# Patient Record
Sex: MALE | Race: WHITE | NOT HISPANIC OR LATINO | Employment: FULL TIME | URBAN - METROPOLITAN AREA
[De-identification: names, ages, dates, MRNs, and addresses within clinical notes are randomized per-mention and may not be internally consistent; named-entity substitution may affect disease eponyms.]

---

## 2017-07-10 ENCOUNTER — OFFICE VISIT (OUTPATIENT)
Dept: FAMILY MEDICINE | Facility: CLINIC | Age: 62
End: 2017-07-10
Payer: COMMERCIAL

## 2017-07-10 VITALS
HEIGHT: 74 IN | HEART RATE: 68 BPM | SYSTOLIC BLOOD PRESSURE: 102 MMHG | TEMPERATURE: 96.1 F | DIASTOLIC BLOOD PRESSURE: 66 MMHG | BODY MASS INDEX: 26.69 KG/M2 | OXYGEN SATURATION: 97 % | WEIGHT: 208 LBS

## 2017-07-10 DIAGNOSIS — Z00.00 ROUTINE GENERAL MEDICAL EXAMINATION AT A HEALTH CARE FACILITY: ICD-10-CM

## 2017-07-10 DIAGNOSIS — Z12.11 SCREEN FOR COLON CANCER: ICD-10-CM

## 2017-07-10 DIAGNOSIS — Z11.59 NEED FOR HEPATITIS C SCREENING TEST: ICD-10-CM

## 2017-07-10 DIAGNOSIS — I48.20 CHRONIC ATRIAL FIBRILLATION (H): ICD-10-CM

## 2017-07-10 DIAGNOSIS — Z12.5 SCREENING PSA (PROSTATE SPECIFIC ANTIGEN): ICD-10-CM

## 2017-07-10 DIAGNOSIS — Z23 NEED FOR VACCINATION: Primary | ICD-10-CM

## 2017-07-10 DIAGNOSIS — Z23 NEED FOR PROPHYLACTIC VACCINATION WITH TETANUS-DIPHTHERIA (TD): ICD-10-CM

## 2017-07-10 PROBLEM — D86.9 SARCOIDOSIS: Status: ACTIVE | Noted: 2017-07-10

## 2017-07-10 LAB
ERYTHROCYTE [DISTWIDTH] IN BLOOD BY AUTOMATED COUNT: 12.9 % (ref 10–15)
HCT VFR BLD AUTO: 39.8 % (ref 40–53)
HCV AB SERPL QL IA: NORMAL
HGB BLD-MCNC: 13.2 G/DL (ref 13.3–17.7)
MCH RBC QN AUTO: 29.7 PG (ref 26.5–33)
MCHC RBC AUTO-ENTMCNC: 33.2 G/DL (ref 31.5–36.5)
MCV RBC AUTO: 89 FL (ref 78–100)
PLATELET # BLD AUTO: 151 10E9/L (ref 150–450)
RBC # BLD AUTO: 4.45 10E12/L (ref 4.4–5.9)
WBC # BLD AUTO: 5.2 10E9/L (ref 4–11)

## 2017-07-10 PROCEDURE — 85027 COMPLETE CBC AUTOMATED: CPT | Performed by: INTERNAL MEDICINE

## 2017-07-10 PROCEDURE — 80061 LIPID PANEL: CPT | Performed by: INTERNAL MEDICINE

## 2017-07-10 PROCEDURE — 90471 IMMUNIZATION ADMIN: CPT | Performed by: INTERNAL MEDICINE

## 2017-07-10 PROCEDURE — 90715 TDAP VACCINE 7 YRS/> IM: CPT | Performed by: INTERNAL MEDICINE

## 2017-07-10 PROCEDURE — 86803 HEPATITIS C AB TEST: CPT | Performed by: INTERNAL MEDICINE

## 2017-07-10 PROCEDURE — 36415 COLL VENOUS BLD VENIPUNCTURE: CPT | Performed by: INTERNAL MEDICINE

## 2017-07-10 PROCEDURE — 99386 PREV VISIT NEW AGE 40-64: CPT | Mod: 25 | Performed by: INTERNAL MEDICINE

## 2017-07-10 PROCEDURE — G0103 PSA SCREENING: HCPCS | Performed by: INTERNAL MEDICINE

## 2017-07-10 PROCEDURE — 80053 COMPREHEN METABOLIC PANEL: CPT | Performed by: INTERNAL MEDICINE

## 2017-07-10 PROCEDURE — 93000 ELECTROCARDIOGRAM COMPLETE: CPT | Performed by: INTERNAL MEDICINE

## 2017-07-10 RX ORDER — SODIUM PHOSPHATE,MONO-DIBASIC 19G-7G/118
1 ENEMA (ML) RECTAL DAILY
COMMUNITY
End: 2019-09-19

## 2017-07-10 NOTE — PROGRESS NOTES
SUBJECTIVE:   CC: Hayden Chowdary is an 61 year old male who presents for preventative health visit.     HPI    Has atrial fibrillation, but had ablation and has had no symptoms since the procedure  He is on a baby aspirin daily  He was noted to have lung nodules during his evaluation for atrial fibrillation and tells me that he has seen Dr. Ernst who followed the nodules and thinks he may have sarcoidosis, but he has not had a biopsy of any of the nodules   He denies chest pains, dyspnea, palpitations, hemoptysis or cough     Today's PHQ-2 Score:   PHQ-2 ( 1999 Pfizer) 7/10/2017   Q1: Little interest or pleasure in doing things 0   Q2: Feeling down, depressed or hopeless 0   PHQ-2 Score 0       Abuse: Current or Past(Physical, Sexual or Emotional)- No  Do you feel safe in your environment - Yes    Social History   Substance Use Topics     Smoking status: Current Some Day Smoker     Types: Cigars     Smokeless tobacco: Not on file      Comment: in past has smoked cigarettes perhaps a pack a month     Alcohol use No      Comment: not for 20 years     The patient does not drink >3 drinks per day nor >7 drinks per week.    Last PSA: No results found for: PSA    Reviewed orders with patient. Reviewed health maintenance and updated orders accordingly - Yes  Patient Active Problem List   Diagnosis     Chronic atrial fibrillation (H)     Sarcoidosis (H)     Primary osteoarthritis of both knees     Past Surgical History:   Procedure Laterality Date     BACK SURGERY  2008    fusion C5-6-7     C NONSPECIFIC PROCEDURE      pyloric stenosis repair     C NONSPECIFIC PROCEDURE      right inguinal hernia     C NONSPECIFIC PROCEDURE      bladder surgery     C NONSPECIFIC PROCEDURE      emergency appendectomy       Social History   Substance Use Topics     Smoking status: Current Some Day Smoker     Types: Cigars     Smokeless tobacco: Not on file      Comment: in past has smoked cigarettes perhaps a pack a month     Alcohol  "use No      Comment: not for 20 years     Family History   Problem Relation Age of Onset     CANCER Mother      colon cancer 73     Neurologic Disorder Father      dad had ALS,  at 60     Cardiovascular Maternal Grandfather      Arthritis Paternal Grandmother      CANCER Paternal Grandfather      lung cancer     GASTROINTESTINAL DISEASE Sister      Cardiovascular Sister      whole in heart         Current Outpatient Prescriptions   Medication Sig Dispense Refill     Naproxen Sodium (ALEVE PO)        glucosamine-chondroitin 500-400 MG CAPS per capsule Take 1 capsule by mouth daily       ASPIRIN 81 MG OR CPEP 1 CAPSULE DAILY       Allergies   Allergen Reactions     Bee Venom Swelling     Contrast Dye Rash       Reviewed and updated as needed this visit by clinical staff  Tobacco  Allergies  Meds  Med Hx  Surg Hx  Fam Hx  Soc Hx        Reviewed and updated as needed this visit by Provider  Tobacco  Med Hx  Surg Hx  Fam Hx  Soc Hx       Past Medical History:   Diagnosis Date     Atrial fibrillation (H)     s/p catheter ablation 2014     Cancer (H)     basal cell  removed from back x 2     Primary osteoarthritis of both knees 7/10/2017     Sarcoidosis (H) 7/10/2017      Past Surgical History:   Procedure Laterality Date     BACK SURGERY      fusion C5-6-7     C NONSPECIFIC PROCEDURE      pyloric stenosis repair     C NONSPECIFIC PROCEDURE      right inguinal hernia     C NONSPECIFIC PROCEDURE      bladder surgery     C NONSPECIFIC PROCEDURE      emergency appendectomy       ROS:  12 organ system review of systems is negative     OBJECTIVE:   /66 (BP Location: Right arm, Cuff Size: Adult Large)  Pulse 68  Temp 96.1  F (35.6  C) (Tympanic)  Ht 6' 2.25\" (1.886 m)  Wt 208 lb (94.3 kg)  SpO2 97%  BMI 26.53 kg/m2    EXAM:  GENERAL: healthy, alert and no distress  EYES: Eyes grossly normal to inspection, PERRL and conjunctivae and sclerae normal  HENT: ear canals and TM's normal, nose and mouth " without ulcers or lesions  NECK: no adenopathy, no asymmetry, masses, or scars and thyroid normal to palpation  RESP: lungs clear to auscultation - no rales, rhonchi or wheezes  CV: regular rate and rhythm, normal S1 S2, no S3 or S4, no murmur, click or rub, no peripheral edema and peripheral pulses strong  ABDOMEN: soft, nontender, no hepatosplenomegaly, no masses and bowel sounds normal  RECTAL: normal sphincter tone, no rectal masses, prostate normal size, smooth, nontender without nodules or masses  MS: no gross musculoskeletal defects noted, no edema  SKIN: no suspicious lesions or rashes  NEURO: Normal strength and tone, mentation intact and speech normal  PSYCH: mentation appears normal, affect normal/bright    ASSESSMENT/PLAN:   1. Routine general medical examination at a health care facility    - Lipid panel reflex to direct LDL  - Comprehensive metabolic panel  - CBC with platelets    2. Chronic atrial fibrillation (H)  Check EKG today  Monitor for reoccurrence of symptoms     3. Screen for colon cancer  Given family history of q 5 year screening interval   - GASTROENTEROLOGY ADULT REF PROCEDURE ONLY    4. Need for hepatitis C screening test  =  - Hepatitis C Screen Reflex to HCV RNA Quant and Genotype    5. Need for prophylactic vaccination with tetanus-diphtheria (TD)    - TDAP VACCINE (ADACEL) [91755.002]  - 1st  Administration  [58888]    6. Need for vaccination      7. Screening PSA (prostate specific antigen)    - PSA, screen    COUNSELING:   Reviewed preventive health counseling, as reflected in patient instructions  Special attention given to:        Regular exercise       Healthy diet/nutrition       Immunizations    Vaccinated for: TDAP           Consider Hep C screening for patients born between 1945 and 1965       Colon cancer screening       Prostate cancer screening         reports that he has been smoking Cigars.  He does not have any smokeless tobacco history on file.  Counseled on risk of  "cigar smoking.  There is no safe amount of tobacco!    Estimated body mass index is 26.53 kg/(m^2) as calculated from the following:    Height as of this encounter: 6' 2.25\" (1.886 m).    Weight as of this encounter: 208 lb (94.3 kg).   Weight management plan: Discussed healthy diet and exercise guidelines and patient will follow up in 12 months in clinic to re-evaluate.    Counseling Resources:  ATP IV Guidelines  Pooled Cohorts Equation Calculator  FRAX Risk Assessment  ICSI Preventive Guidelines  Dietary Guidelines for Americans, 2010  USDA's MyPlate  ASA Prophylaxis  Lung CA Screening    Grayson Mae MD  [facility]  "

## 2017-07-10 NOTE — LETTER
"[facility]  [address] [facility]  [address]  [place]  [address]  Tel: [phone]    July 12, 2017    Hayden OLVERA Ricarda  [address] [facility] [address]        Dear Mr. Chowdary,    -Your total cholesterol is 207 which is above your goal of total cholesterol less than 200.     -Your triglycerides are 149 which are at your goal of triglycerides less than 150.     -Your HDL or \"good cholesterol\" is 47 which is at your goal of HDL cholesterol greater than 40.     -Your LDL cholesterol or \"bad cholesterol\" is 130 which is at your goal of LDL cholesterol less than <160.  Your LDL goal is based on your risk factors for artery disease.     -Liver and gallbladder tests are normal for you. (ALT,AST, Alk phos, bilirubin), kidney function is normal for you (Creatinine, GFR), Sodium is normal, Potassium is normal for you, Calcium is normal for you, Glucose (blood sugar) is normal for you.       -Your prostate specific antigen (PSA) test result returned normal.     -Your hepatitis C screening test is negative.  You do not have hepatitis C.     -Your complete blood counts including your hemoglobin returned normal for you.         Bottom line:  Your cholesterol is a little high, but you do not need a medication for this.  You can reduce your total and LDL cholesterol levels by eating less saturated fats.  This means you should eat less fried foods and meat.  If you eat meat, you should try to eat more chicken and fish and less beef or pork.  Also, you should increase dietary fiber intake by eating more fruits, vegetables and whole grains.  A diet high in fiber reduces total and LDL cholesterol levels. We can recheck your cholesterol in one year.         Follow up:  Schedule an appointment for a physical examination with fasting blood tests in one year's time, or return sooner if new questions, symptoms or problems arise.       Sincerely,   Dr. Mae/NB    Results for orders placed or performed in " visit on 07/10/17   Hepatitis C Screen Reflex to HCV RNA Quant and Genotype   Result Value Ref Range    Hepatitis C Antibody  NR     Nonreactive   Assay performance characteristics have not been established for newborns,   infants, and children     Lipid panel reflex to direct LDL   Result Value Ref Range    Cholesterol 207 (H) <200 mg/dL    Triglycerides 149 <150 mg/dL    HDL Cholesterol 47 >39 mg/dL    LDL Cholesterol Calculated 130 (H) <100 mg/dL    Non HDL Cholesterol 160 (H) <130 mg/dL   Comprehensive metabolic panel   Result Value Ref Range    Sodium 142 133 - 144 mmol/L    Potassium 4.4 3.4 - 5.3 mmol/L    Chloride 108 94 - 109 mmol/L    Carbon Dioxide 27 20 - 32 mmol/L    Anion Gap 7 3 - 14 mmol/L    Glucose 89 70 - 99 mg/dL    Urea Nitrogen 18 7 - 30 mg/dL    Creatinine 1.00 0.66 - 1.25 mg/dL    GFR Estimate 76 >60 mL/min/1.7m2    GFR Estimate If Black >90   GFR Calc   >60 mL/min/1.7m2    Calcium 8.6 8.5 - 10.1 mg/dL    Bilirubin Total 0.5 0.2 - 1.3 mg/dL    Albumin 4.0 3.4 - 5.0 g/dL    Protein Total 7.2 6.8 - 8.8 g/dL    Alkaline Phosphatase 67 40 - 150 U/L    ALT 23 0 - 70 U/L    AST 14 0 - 45 U/L   CBC with platelets   Result Value Ref Range    WBC 5.2 4.0 - 11.0 10e9/L    RBC Count 4.45 4.4 - 5.9 10e12/L    Hemoglobin 13.2 (L) 13.3 - 17.7 g/dL    Hematocrit 39.8 (L) 40.0 - 53.0 %    MCV 89 78 - 100 fl    MCH 29.7 26.5 - 33.0 pg    MCHC 33.2 31.5 - 36.5 g/dL    RDW 12.9 10.0 - 15.0 %    Platelet Count 151 150 - 450 10e9/L   PSA, screen   Result Value Ref Range    PSA 2.74 0 - 4 ug/L

## 2017-07-10 NOTE — NURSING NOTE
"Chief Complaint   Patient presents with     Physical     Fasting       Initial /66 (BP Location: Right arm, Cuff Size: Adult Large)  Pulse 68  Temp 96.1  F (35.6  C) (Tympanic)  Ht 6' 2.25\" (1.886 m)  Wt 208 lb (94.3 kg)  SpO2 97%  BMI 26.53 kg/m2 Estimated body mass index is 26.53 kg/(m^2) as calculated from the following:    Height as of this encounter: 6' 2.25\" (1.886 m).    Weight as of this encounter: 208 lb (94.3 kg).  Medication Reconciliation: complete   Laurence Guerrero MA      "

## 2017-07-10 NOTE — MR AVS SNAPSHOT
After Visit Summary   7/10/2017    Hayden Chowdary    MRN: [id number]           Patient Information     Date Of Birth          1955        Visit Information        Provider Department      7/10/2017 9:30 AM Grayson Mae MD [facility]        Today's Diagnoses     Need for vaccination    -  1    Routine general medical examination at a health care facility        Chronic atrial fibrillation (H)        Screen for colon cancer        Need for hepatitis C screening test        Need for prophylactic vaccination with tetanus-diphtheria (TD)        Screening PSA (prostate specific antigen)           Follow-ups after your visit        Additional Services     GASTROENTEROLOGY ADULT REF PROCEDURE ONLY       Last Lab Result: Creatinine (mg/dL)       Date                     Value                 01/31/2014               1.28 (H)         ----------  Body mass index is 26.53 kg/(m^2).     Needed:  No  Language:  English    Patient will be contacted to schedule procedure.     Please be aware that coverage of these services is subject to the terms and limitations of your health insurance plan.  Call member services at your health plan with any benefit or coverage questions.  Any procedures must be performed at a [facility] facility OR coordinated by your clinic's referral office.    Please bring the following with you to your appointment:    (1) Any X-Rays, CTs or MRIs which have been performed.  Contact the facility where they were done to arrange for  prior to your scheduled appointment.    (2) List of current medications   (3) This referral request   (4) Any documents/labs given to you for this referral                  Who to contact     If you have questions or need follow up information about today's clinic visit or your schedule please contact [facility] directly at [phone].  Normal or non-critical lab and imaging results will be communicated to you by  "MyChart, letter or phone within 4 business days after the clinic has received the results. If you do not hear from us within 7 days, please contact the clinic through [employer] or phone. If you have a critical or abnormal lab result, we will notify you by phone as soon as possible.  Submit refill requests through [employer] or call your pharmacy and they will forward the refill request to us. Please allow 3 business days for your refill to be completed.          Additional Information About Your Visit        [employer] Information     [employer] lets you send messages to your doctor, view your test results, renew your prescriptions, schedule appointments and more. To sign up, go to [email or web address] . Click on \"Log in\" on the left side of the screen, which will take you to the Welcome page. Then click on \"Sign up Now\" on the right side of the page.     You will be asked to enter the access code listed below, as well as some personal information. Please follow the directions to create your username and password.     Your access code is: [email or web address]  Expires: 10/8/2017  9:42 AM     Your access code will  in 90 days. If you need help or a new code, please call your [place] clinic or [phone].        Care EveryWhere ID     This is your Care EveryWhere ID. This could be used by other organizations to access your [place] medical records  [id number]        Your Vitals Were     Pulse Temperature Height Pulse Oximetry BMI (Body Mass Index)       68 96.1  F (35.6  C) (Tympanic) 6' 2.25\" (1.886 m) 97% 26.53 kg/m2        Blood Pressure from Last 3 Encounters:   07/10/17 102/66   06/18/15 100/64   14 104/66    Weight from Last 3 Encounters:   07/10/17 208 lb (94.3 kg)   06/18/15 218 lb (98.9 kg)   14 197 lb (89.4 kg)              We Performed the Following     1st  Administration  [[id number]]     CBC with platelets     Comprehensive metabolic panel     EKG 12-lead complete w/read - Clinics     GASTROENTEROLOGY " ADULT REF PROCEDURE ONLY     Hepatitis C Screen Reflex to HCV RNA Quant and Genotype     Lipid panel reflex to direct LDL     PSA, screen     TDAP VACCINE (ADACEL) [77464.002]        Primary Care Provider Office Phone # Fax #    Grayson Mae [phone]       [facility] [address] [facility] [facility] [address]        Equal Access to Services     [facility] HospitalSUMAYA : Hadii aad ku hadasho Soomaali, waaxda luqadaha, qaybta kaalmada adeegyada, waxay idiin hayaan adeeg kharash la'aan ah. So [facility] [phone].    ATENCIÓN: Si habla español, tiene a robertson disposición servicios gratuitos de asistencia lingüística. Ll[facility] North [facility] [phone].    We comply with applicable federal civil rights laws and [place] laws. We do not discriminate on the basis of race, color, national origin, age, disability sex, sexual orientation or gender identity.            Thank you!     Thank you for choosing [facility]  for your care. Our goal is always to provide you with excellent care. Hearing back from our patients is one way we can continue to improve our services. Please take a few minutes to complete the written survey that you may receive in the mail after your visit with us. Thank you!             Your Updated Medication List - Protect others around you: Learn how to safely use, store and throw away your medicines at www.disposemymeds.org.          This list is accurate as of: 7/10/17 10:41 AM.  Always use your most recent med list.                   Brand Name Dispense Instructions for use Diagnosis    ALEVE PO           ASPIRIN 81 MG Cpep      1 CAPSULE DAILY        glucosamine-chondroitin 500-400 MG Caps per capsule      Take 1 capsule by mouth daily

## 2017-07-10 NOTE — NURSING NOTE
Screening Questionnaire for Adult Immunization    Are you sick today?   No   Do you have allergies to medications, food, a vaccine component or latex?   No   Have you ever had a serious reaction after receiving a vaccination?   No   Do you have a long-term health problem with heart disease, lung disease, asthma, kidney disease, metabolic disease (e.g. diabetes), anemia, or other blood disorder?   No   Do you have cancer, leukemia, HIV/AIDS, or any other immune system problem?   No   In the past 3 months, have you taken medications that affect  your immune system, such as prednisone, other steroids, or anticancer drugs; drugs for the treatment of rheumatoid arthritis, Crohn s disease, or psoriasis; or have you had radiation treatments?   No   Have you had a seizure, or a brain or other nervous system problem?   No   During the past year, have you received a transfusion of blood or blood     products, or been given immune (gamma) globulin or antiviral drug?   No   For women: Are you pregnant or is there a chance you could become        pregnant during the next month?   No   Have you received any vaccinations in the past 4 weeks?   No     Immunization questionnaire answers were all negative.      MNVFC doesn't apply on this patient    Per orders of Dr. Mae, injection of Tdap given by Laurence Guerrero. Patient instructed to remain in clinic for 15 minutes afterwards, and to report any adverse reaction to me immediately.       Screening performed by Laurence Guerrero on 7/10/2017 at 9:10 AM.

## 2017-07-11 LAB
ALBUMIN SERPL-MCNC: 4 G/DL (ref 3.4–5)
ALP SERPL-CCNC: 67 U/L (ref 40–150)
ALT SERPL W P-5'-P-CCNC: 23 U/L (ref 0–70)
ANION GAP SERPL CALCULATED.3IONS-SCNC: 7 MMOL/L (ref 3–14)
AST SERPL W P-5'-P-CCNC: 14 U/L (ref 0–45)
BILIRUB SERPL-MCNC: 0.5 MG/DL (ref 0.2–1.3)
BUN SERPL-MCNC: 18 MG/DL (ref 7–30)
CALCIUM SERPL-MCNC: 8.6 MG/DL (ref 8.5–10.1)
CHLORIDE SERPL-SCNC: 108 MMOL/L (ref 94–109)
CHOLEST SERPL-MCNC: 207 MG/DL
CO2 SERPL-SCNC: 27 MMOL/L (ref 20–32)
CREAT SERPL-MCNC: 1 MG/DL (ref 0.66–1.25)
GFR SERPL CREATININE-BSD FRML MDRD: 76 ML/MIN/1.7M2
GLUCOSE SERPL-MCNC: 89 MG/DL (ref 70–99)
HDLC SERPL-MCNC: 47 MG/DL
LDLC SERPL CALC-MCNC: 130 MG/DL
NONHDLC SERPL-MCNC: 160 MG/DL
POTASSIUM SERPL-SCNC: 4.4 MMOL/L (ref 3.4–5.3)
PROT SERPL-MCNC: 7.2 G/DL (ref 6.8–8.8)
PSA SERPL-ACNC: 2.74 UG/L (ref 0–4)
SODIUM SERPL-SCNC: 142 MMOL/L (ref 133–144)
TRIGL SERPL-MCNC: 149 MG/DL

## 2017-07-12 NOTE — PROGRESS NOTES
"The following letter pertains to your most recent diagnostic tests:    -Your total cholesterol is 207 which is above your goal of total cholesterol less than 200.    -Your triglycerides are 149 which are at your goal of triglycerides less than 150.    -Your HDL or \"good cholesterol\" is 47 which is at your goal of HDL cholesterol greater than 40.    -Your LDL cholesterol or \"bad cholesterol\" is 130 which is at your goal of LDL cholesterol less than <160.  Your LDL goal is based on your risk factors for artery disease.     -Liver and gallbladder tests are normal for you. (ALT,AST, Alk phos, bilirubin), kidney function is normal for you (Creatinine, GFR), Sodium is normal, Potassium is normal for you, Calcium is normal for you, Glucose (blood sugar) is normal for you.      -Your prostate specific antigen (PSA) test result returned normal.     -Your hepatitis C screening test is negative.  You do not have hepatitis C.     -Your complete blood counts including your hemoglobin returned normal for you.         Bottom line:  Your cholesterol is a little high, but you do not need a medication for this.  You can reduce your total and LDL cholesterol levels by eating less saturated fats.  This means you should eat less fried foods and meat.  If you eat meat, you should try to eat more chicken and fish and less beef or pork.  Also, you should increase dietary fiber intake by eating more fruits, vegetables and whole grains.  A diet high in fiber reduces total and LDL cholesterol levels. We can recheck your cholesterol in one year.        Follow up:  Schedule an appointment for a physical examination with fasting blood tests in one year's time, or return sooner if new questions, symptoms or problems arise.       Sincerely,    Dr. Mae    The 10-year ASCVD risk score (Belia JULIA Jr, et al., 2013) is: 5.8%    Values used to calculate the score:      Age: 61 years      Sex: Male      Is Non- : No      " Diabetic: No      Tobacco smoker: No      Systolic Blood Pressure: 102 mmHg      Is BP treated: No      HDL Cholesterol: 47 mg/dL      Total Cholesterol: 207 mg/dL

## 2017-09-18 ENCOUNTER — TRANSFERRED RECORDS (OUTPATIENT)
Dept: HEALTH INFORMATION MANAGEMENT | Facility: CLINIC | Age: 62
End: 2017-09-18

## 2018-02-05 ENCOUNTER — OFFICE VISIT (OUTPATIENT)
Dept: FAMILY MEDICINE | Facility: CLINIC | Age: 63
End: 2018-02-05
Payer: COMMERCIAL

## 2018-02-05 VITALS
SYSTOLIC BLOOD PRESSURE: 100 MMHG | TEMPERATURE: 97.4 F | WEIGHT: 215 LBS | OXYGEN SATURATION: 97 % | BODY MASS INDEX: 27.59 KG/M2 | DIASTOLIC BLOOD PRESSURE: 70 MMHG | HEIGHT: 74 IN | HEART RATE: 73 BPM

## 2018-02-05 DIAGNOSIS — D86.9 SARCOIDOSIS: ICD-10-CM

## 2018-02-05 DIAGNOSIS — H26.9 CATARACT OF BOTH EYES, UNSPECIFIED CATARACT TYPE: ICD-10-CM

## 2018-02-05 DIAGNOSIS — I48.20 CHRONIC ATRIAL FIBRILLATION (H): ICD-10-CM

## 2018-02-05 DIAGNOSIS — Z01.818 PREOP GENERAL PHYSICAL EXAM: Primary | ICD-10-CM

## 2018-02-05 PROCEDURE — 93000 ELECTROCARDIOGRAM COMPLETE: CPT | Performed by: INTERNAL MEDICINE

## 2018-02-05 PROCEDURE — 99214 OFFICE O/P EST MOD 30 MIN: CPT | Performed by: INTERNAL MEDICINE

## 2018-02-05 RX ORDER — TURMERIC ROOT EXTRACT 500 MG
500 TABLET ORAL DAILY
COMMUNITY
End: 2019-09-19

## 2018-02-05 NOTE — NURSING NOTE
"Chief Complaint   Patient presents with     Pre-Op Exam       Initial /70 (BP Location: Right arm, Cuff Size: Adult Regular)  Pulse 73  Temp 97.4  F (36.3  C) (Oral)  Ht 6' 2.25\" (1.886 m)  Wt 215 lb (97.5 kg)  SpO2 97%  BMI 27.42 kg/m2 Estimated body mass index is 27.42 kg/(m^2) as calculated from the following:    Height as of this encounter: 6' 2.25\" (1.886 m).    Weight as of this encounter: 215 lb (97.5 kg).  Medication Reconciliation: complete    "

## 2018-02-05 NOTE — PROGRESS NOTES
[address] [address]  [phone]  Dept: [phone]    PRE-OP EVALUATION:  Today's date: 2018    Hayden Chowdary (: 1955) presents for pre-operative evaluation assessment as requested by Dr. Brown.  He requires evaluation and anesthesia risk assessment prior to undergoing surgery/procedure for treatment of bilateral cataracts .  Proposed procedure:  2018 Cataract Removal, right and 2018 Cataract Removal, left.    Date of Surgery/ Procedure: 2018 and 2018  Time of Surgery/ Procedure: 7:30 AM  Hospital/Surgical Facility: [facility]  Fax number for surgical facility: [phone]  Primary Physician: Grayson Mae  Type of Anesthesia Anticipated: to be determined    Patient has a Health Care Directive or Living Will:  NO    1. NO - Do you have a history of heart attack, stroke, stent, bypass or surgery on an artery in the head, neck, heart or legs?  2. NO - Do you ever have any pain or discomfort in your chest?  3. NO - Do you have a history of  Heart Failure?  4. NO - Are you troubled by shortness of breath when: walking on the level, up a slight hill or at night?  5. NO - Do you currently have a cold, bronchitis or other respiratory infection?  6. NO - Do you have a cough, shortness of breath or wheezing?  7. NO - Do you sometimes get pains in the calves of your legs when you walk?  8. NO - Do you or anyone in your family have previous history of blood clots?  9. NO - Do you or does anyone in your family have a serious bleeding problem such as prolonged bleeding following surgeries or cuts?  10. NO - Have you ever had problems with anemia or been told to take iron pills?  11. NO - Have you had any abnormal blood loss such as black, tarry or bloody stools, or abnormal vaginal bleeding?  12. NO - Have you ever had a blood transfusion?  13. NO - Have you or any of your relatives ever had problems with  anesthesia?  14. NO - Do you have sleep apnea, excessive snoring or daytime drowsiness?  15. NO - Do you have any prosthetic heart valves?  16. NO - Do you have prosthetic joints?  17. NO - Is there any chance that you may be pregnant?      HPI:                                                      Brief HPI related to upcoming procedure: progressive night vision problems in both eyes worse over years now moderate to severe.  He can easily perform 4 METS of physical activity without chest pain or dyspnea.  He was told he has pulmonary sarcoidosis in the past, but was told he did not need follow up with pulmonary medicine after several visits.  He has no current symptoms of cough, sputum production, fevers, dyspnea.  He had a atrial fibrillation ablation in 1/2014 and has not had recurrent palpitations or chest pains since the procedure.  His EKG in 7/2017 showed sinus rhythm.        MEDICAL HISTORY:                                                      Patient Active Problem List    Diagnosis Date Noted     Sarcoidosis 07/10/2017     Priority: Medium     Primary osteoarthritis of both knees 07/10/2017     Priority: Medium     Chronic atrial fibrillation (H) 06/12/2014     Priority: Medium     Status post atrial fibrillation ablation         Past Medical History:   Diagnosis Date     Atrial fibrillation (H)     s/p catheter ablation 1/2014     Cancer (H)     basal cell  removed from back x 2     Primary osteoarthritis of both knees 7/10/2017     Sarcoidosis 7/10/2017     Past Surgical History:   Procedure Laterality Date     BACK SURGERY  2008    fusion C5-6-7     C NONSPECIFIC PROCEDURE      pyloric stenosis repair     C NONSPECIFIC PROCEDURE      right inguinal hernia     C NONSPECIFIC PROCEDURE      bladder surgery     C NONSPECIFIC PROCEDURE      emergency appendectomy     Current Outpatient Prescriptions   Medication Sig Dispense Refill     Turmeric 500 MG TABS Take 500 mg by mouth daily       Naproxen Sodium  "(ALEVE PO) Take 220 mg by mouth 2 times daily (with meals)        glucosamine-chondroitin 500-400 MG CAPS per capsule Take 1 capsule by mouth daily       OTC products: None, except as noted above    Allergies   Allergen Reactions     Bee Venom Swelling     Contrast Dye Rash      Latex Allergy: NO    Social History   Substance Use Topics     Smoking status: Former Smoker     Types: Cigars     Smokeless tobacco: Never Used      Comment: in past has smoked cigarettes perhaps a pack a month     Alcohol use No      Comment: not for 20 years     History   Drug Use No       REVIEW OF SYSTEMS:                                                    A 12 organ systems ROS is negative.     EXAM:                                                    /70 (BP Location: Right arm, Cuff Size: Adult Regular)  Pulse 73  Temp 97.4  F (36.3  C) (Oral)  Ht 6' 2.25\" (1.886 m)  Wt 215 lb (97.5 kg)  SpO2 97%  BMI 27.42 kg/m2    GENERAL APPEARANCE: healthy, alert and no distress     EYES: EOMI,  PERRL     HENT: ear canals and TM's normal and nose and mouth without ulcers or lesions     NECK: no adenopathy, no asymmetry, masses, or scars and thyroid normal to palpation     RESP: lungs clear to auscultation - no rales, rhonchi or wheezes     CV: regular rates and rhythm, normal S1 S2, no S3 or S4 and no murmur, click or rub     ABDOMEN:  soft, nontender, no HSM or masses and bowel sounds normal     MS: extremities normal- no gross deformities noted, no evidence of inflammation in joints, FROM in all extremities.     SKIN: no suspicious lesions or rashes     NEURO: Normal strength and tone, sensory exam grossly normal, mentation intact and speech normal     PSYCH: mentation appears normal. and affect normal/bright     LYMPHATICS: No cervical, or supraclavicular nodes    DIAGNOSTICS:                                                    EKG: Sinus rhythm, incomplete LBBB, no ST changes     Recent Labs   Lab Test  07/10/17   1044  01/31/14   " 0658   HGB  13.2*  15.8   PLT  151  135*   NA  142  140   POTASSIUM  4.4  4.4   CR  1.00  1.28*        IMPRESSION:                                                    Reason for surgery/procedure: Bilateral cataracts   Diagnosis/reason for consult: Preoperative evaluation     The proposed surgical procedure is considered LOW risk.    REVISED CARDIAC RISK INDEX  The patient has the following serious cardiovascular risks for perioperative complications such as (MI, PE, VFib and 3  AV Block):  No serious cardiac risks  INTERPRETATION: 0 risks: Class I (very low risk - 0.4% complication rate)    The patient has the following additional risks for perioperative complications:  No identified additional risks      ICD-10-CM    1. Preop general physical exam Z01.818 EKG 12-lead complete w/read - Clinics   2. Cataract of both eyes, unspecified cataract type H26.9    3. Chronic atrial fibrillation (H) I48.2    4. Sarcoidosis D86.9        RECOMMENDATIONS:                                                        APPROVAL GIVEN to proceed with proposed procedure, without further diagnostic evaluation       Signed Electronically by: Grayson Mae MD    Copy of this evaluation report is provided to requesting physician.    [facility] Preop Guidelines

## 2018-02-05 NOTE — MR AVS SNAPSHOT
After Visit Summary   2/5/2018    Hayden Chowdary    MRN: [id number]           Patient Information     Date Of Birth          1955        Visit Information        Provider Department      2/5/2018 1:30 PM Grayson Mae MD [facility]        Today's Diagnoses     Preop general physical exam    -  1    Cataract of both eyes, unspecified cataract type        Chronic atrial fibrillation (H)        Sarcoidosis          Care Instructions      Before Your Surgery      Call your surgeon if there is any change in your health. This includes signs of a cold or flu (such as a sore throat, runny nose, cough, rash or fever).    Do not smoke, drink alcohol or take over the counter medicine (unless your surgeon or primary care doctor tells you to) for the 24 hours before and after surgery.    If you take prescribed drugs: Follow your doctor s orders about which medicines to take and which to stop until after surgery.    Eating and drinking prior to surgery: follow the instructions from your surgeon    Take a shower or bath the night before surgery. Use the soap your surgeon gave you to gently clean your skin. If you do not have soap from your surgeon, use your regular soap. Do not shave or scrub the surgery site.  Wear clean pajamas and have clean sheets on your bed.           Follow-ups after your visit        Who to contact     If you have questions or need follow up information about today's clinic visit or your schedule please contact [facility] directly at [phone].  Normal or non-critical lab and imaging results will be communicated to you by MyChart, letter or phone within 4 business days after the clinic has received the results. If you do not hear from us within 7 days, please contact the clinic through MyChart or phone. If you have a critical or abnormal lab result, we will notify you by phone as soon as possible.  Submit refill requests through [employer] or call your  "pharmacy and they will forward the refill request to us. Please allow 3 business days for your refill to be completed.          Additional Information About Your Visit        MyChart Information     [employer] gives you secure access to your electronic health record. If you see a primary care provider, you can also send messages to your care team and make appointments. If you have questions, please call your primary care clinic.  If you do not have a primary care provider, please call [phone] and they will assist you.        Care EveryWhere ID     This is your Care EveryWhere ID. This could be used by other organizations to access your [facility] medical records  [id number]        Your Vitals Were     Pulse Temperature Height Pulse Oximetry BMI (Body Mass Index)       73 97.4  F (36.3  C) (Oral) 6' 2.25\" (1.886 m) 97% 27.42 kg/m2        Blood Pressure from Last 3 Encounters:   02/05/18 100/70   07/10/17 102/66   06/18/15 100/64    Weight from Last 3 Encounters:   02/05/18 215 lb (97.5 kg)   07/10/17 208 lb (94.3 kg)   06/18/15 218 lb (98.9 kg)              We Performed the Following     EKG 12-lead complete w/read - Clinics        Primary Care Provider Office Phone # Fax #    Grayson TRAORE MD [facility] [phone]       [facility] [address]  [place] [address]        Equal Access to Services     [facility]: Hadii aad ku hadasho Soomaali, waaxda luqadaha, qaybta kaalmada adeegyada, willi davenport . So [facility] [phone].    ATENCIÓN: Si habla español, tiene a robertson disposición servicios gratuitos de asistencia lingüística. Llame al [phone].    We comply with applicable federal civil rights laws and [place] laws. We do not discriminate on the basis of race, color, national origin, age, disability, sex, sexual orientation, or gender identity.            Thank you!     Thank you for choosing [facility]  for your care. Our goal is always to " provide you with excellent care. Hearing back from our patients is one way we can continue to improve our services. Please take a few minutes to complete the written survey that you may receive in the mail after your visit with us. Thank you!             Your Updated Medication List - Protect others around you: Learn how to safely use, store and throw away your medicines at www.disposemymeds.org.          This list is accurate as of 2/5/18  5:05 PM.  Always use your most recent med list.                   Brand Name Dispense Instructions for use Diagnosis    ALEVE PO      Take 220 mg by mouth 2 times daily (with meals)        glucosamine-chondroitin 500-400 MG Caps per capsule      Take 1 capsule by mouth daily        Turmeric 500 MG Tabs      Take 500 mg by mouth daily

## 2018-11-15 ENCOUNTER — TELEPHONE (OUTPATIENT)
Dept: OTHER | Facility: CLINIC | Age: 63
End: 2018-11-15

## 2018-11-15 NOTE — TELEPHONE ENCOUNTER
11/15/2018    Call Regarding Onboarding P1 Other    Attempt 1    Message HUNG UP    Comments:       Outreach   CWR

## 2018-12-01 NOTE — TELEPHONE ENCOUNTER
12/1/2018    Call Regarding Onboarding P1 Other    Attempt 2    Message on voicemail     Comments:       Outreach   CWR

## 2019-05-21 ENCOUNTER — DOCUMENTATION ONLY (OUTPATIENT)
Dept: OTHER | Facility: CLINIC | Age: 64
End: 2019-05-21

## 2019-08-20 ENCOUNTER — OFFICE VISIT (OUTPATIENT)
Dept: FAMILY MEDICINE | Facility: CLINIC | Age: 64
End: 2019-08-20
Payer: COMMERCIAL

## 2019-08-20 VITALS
WEIGHT: 211.9 LBS | RESPIRATION RATE: 16 BRPM | OXYGEN SATURATION: 98 % | BODY MASS INDEX: 27.02 KG/M2 | TEMPERATURE: 97.6 F | HEART RATE: 58 BPM | DIASTOLIC BLOOD PRESSURE: 72 MMHG | SYSTOLIC BLOOD PRESSURE: 116 MMHG

## 2019-08-20 DIAGNOSIS — Z71.84 ENCOUNTER FOR COUNSELING FOR TRAVEL: Primary | ICD-10-CM

## 2019-08-20 PROCEDURE — 99401 PREV MED CNSL INDIV APPRX 15: CPT | Performed by: PHYSICIAN ASSISTANT

## 2019-08-20 RX ORDER — AZITHROMYCIN 500 MG/1
500 TABLET, FILM COATED ORAL DAILY
Qty: 3 TABLET | Refills: 0 | Status: SHIPPED | OUTPATIENT
Start: 2019-08-20 | End: 2019-09-19

## 2019-08-20 NOTE — PROGRESS NOTES
SUBJECTIVE: Hayden Chowdary , a 63 year old  male, presents for counseling and information regarding upcoming travel to [place], [facility], and [facility]. Special medical concerns include: none. He anticipates the following unusual exposures: none.    Itinerary:  [facility], [facility]     Departure Date: 9/3/19 Return date: 9/14/19    Reason for travel (i.e. Business, pleasure): pleasure     Visiting an urban or rural area?: both      Accommodations (i.e. hotel, hostel, friends, family, etc): hotel and criuse     Women - First day of your last period:  NA     IMMUNIZATION HISTORY  Have you received any vaccinations in the past 4 weeks?  No  Have you ever fainted from having your blood drawn or from an injection?  No  Have you ever had a fever reaction to vaccination?  No  Have you ever had any bad reaction or side effect from any vaccination?  No  Have you ever had hepatitis A or B vaccine?  yes  Do you live (or work closely) with anyone who has AIDS, an AIDS-like condition, any other immune disorder or who is on chemotherapy for cancer?  No  Have you received any injection of immune globulin or any blood products during the past 12 months?  No    GENERAL MEDICAL HISTORY  Do you have a medical condition that warrants maintenance medication or physician follow-up?  No  Do you have a medical condition that is stable now, but that may recur while traveling?  No  Has your spleen been removed?  No  Have you had an acute illness or a fever in the past 48 hours?  No  Are you pregnant, or might you become pregnant on this trip?  Any chance of pregnancy?  No  Are you breastfeeding?  No  Do you have HIV, AIDS, an AIDS-like condition, any other immune disorder, leukemia or cancer?  No  Do you have a severe combined immunodeficiency disease?  No  Have you had your thymus gland removed or history of problems with your thymus, such as myasthenia gravis, DiGeorge syndrome, or thymoma?  No    Do you have severe thrombocytopenia (low  platelet count) or a coagulation disorder?  No  Have you ever had a convulsion, seizure, epilepsy, neurologic condition or brain infection?  No  Do you have any stomach conditions?  No  Do you have a G6PD deficiency?  No  Do you have severe renal or kidney impairment?  No  Do you have a history of psychiatric problems?  No  Do you have a problem with strange dreams and/or nightmares?  No  Do you have insomnia?  No  Do you have problems with vaginitis?  No  Do you have psoriasis?  No  Are you prone to motion sickness?  No  Have you ever had headaches, nausea, vomiting, or breathing problems from altitude exposure?  No      Past Medical History:   Diagnosis Date     Atrial fibrillation (H)     s/p catheter ablation 1/2014     Cancer (H)     basal cell  removed from back x 2     Primary osteoarthritis of both knees 7/10/2017     Sarcoidosis 7/10/2017      Immunization History   Administered Date(s) Administered     HEPA 01/24/2001, 07/25/2001     HepB 07/25/2001, 08/29/2001, 01/09/2002     Influenza Vaccine IM 3yrs+ 4 Valent IIV4 09/26/2016, 10/01/2017     TD (ADULT, 7+) 01/24/2001     TDAP Vaccine (Adacel) 07/10/2017       Current Outpatient Medications   Medication Sig Dispense Refill     glucosamine-chondroitin 500-400 MG CAPS per capsule Take 1 capsule by mouth daily       Naproxen Sodium (ALEVE PO) Take 220 mg by mouth 2 times daily (with meals)        Turmeric 500 MG TABS Take 500 mg by mouth daily       Allergies   Allergen Reactions     Bee Venom Swelling     Contrast Dye Rash        EXAM: deferred    Immunizations discussed include: Typhoid  Malaria prophylaxis recommended: not needed  Symptomatic treatment for traveler's diarrhea: bismuth subsalicylate, loperamide/diphenoxylate and azithromycin    ASSESSMENT/PLAN:    (Z71.89) Encounter for counseling for travel  (primary encounter diagnosis)    Comment: Oral typhoid vaccines today. Patient will return or follow-up with PCP as needed. Prophylaxis given for  Traveler's diarrhea and is not needed for Malaria. All questions were answered.     Plan: typhoid (VIVOTIF) CR capsule, azithromycin         (ZITHROMAX) 500 MG tablet              I have reviewed general recommendations for safe travel   including: food/water precautions, insect avoidance, safe sex   practices given high prevalence of HIV and other STDs,   roadway safety. Educational materials and links to the [facility]   Traveler's health website have been provided.    Total time 15 minutes, greater than 50 percent in counseling   and coordination of care.

## 2019-09-19 ENCOUNTER — OFFICE VISIT (OUTPATIENT)
Dept: FAMILY MEDICINE | Facility: CLINIC | Age: 64
End: 2019-09-19
Payer: COMMERCIAL

## 2019-09-19 VITALS
WEIGHT: 210 LBS | TEMPERATURE: 97.6 F | OXYGEN SATURATION: 95 % | BODY MASS INDEX: 26.95 KG/M2 | DIASTOLIC BLOOD PRESSURE: 65 MMHG | HEIGHT: 74 IN | HEART RATE: 82 BPM | SYSTOLIC BLOOD PRESSURE: 100 MMHG

## 2019-09-19 DIAGNOSIS — Z23 NEED FOR PROPHYLACTIC VACCINATION AND INOCULATION AGAINST INFLUENZA: ICD-10-CM

## 2019-09-19 DIAGNOSIS — Z11.4 ENCOUNTER FOR SCREENING FOR HIV: ICD-10-CM

## 2019-09-19 DIAGNOSIS — Z12.5 PROSTATE CANCER SCREENING: ICD-10-CM

## 2019-09-19 DIAGNOSIS — Z00.00 ROUTINE GENERAL MEDICAL EXAMINATION AT A HEALTH CARE FACILITY: Primary | ICD-10-CM

## 2019-09-19 DIAGNOSIS — I48.0 PAROXYSMAL ATRIAL FIBRILLATION (H): ICD-10-CM

## 2019-09-19 DIAGNOSIS — D86.9 SARCOIDOSIS: ICD-10-CM

## 2019-09-19 LAB
ALBUMIN SERPL-MCNC: 4.1 G/DL (ref 3.4–5)
ALP SERPL-CCNC: 70 U/L (ref 40–150)
ALT SERPL W P-5'-P-CCNC: 24 U/L (ref 0–70)
ANION GAP SERPL CALCULATED.3IONS-SCNC: <1 MMOL/L (ref 3–14)
AST SERPL W P-5'-P-CCNC: 15 U/L (ref 0–45)
BILIRUB SERPL-MCNC: 0.5 MG/DL (ref 0.2–1.3)
BUN SERPL-MCNC: 18 MG/DL (ref 7–30)
CALCIUM SERPL-MCNC: 8.9 MG/DL (ref 8.5–10.1)
CHLORIDE SERPL-SCNC: 111 MMOL/L (ref 94–109)
CHOLEST SERPL-MCNC: 193 MG/DL
CO2 SERPL-SCNC: 29 MMOL/L (ref 20–32)
CREAT SERPL-MCNC: 1.01 MG/DL (ref 0.66–1.25)
ERYTHROCYTE [DISTWIDTH] IN BLOOD BY AUTOMATED COUNT: 12.7 % (ref 10–15)
GFR SERPL CREATININE-BSD FRML MDRD: 79 ML/MIN/{1.73_M2}
GLUCOSE SERPL-MCNC: 92 MG/DL (ref 70–99)
HCT VFR BLD AUTO: 39.8 % (ref 40–53)
HDLC SERPL-MCNC: 40 MG/DL
HGB BLD-MCNC: 13.6 G/DL (ref 13.3–17.7)
HIV 1+2 AB+HIV1 P24 AG SERPL QL IA: NONREACTIVE
LDLC SERPL CALC-MCNC: 127 MG/DL
MCH RBC QN AUTO: 30.6 PG (ref 26.5–33)
MCHC RBC AUTO-ENTMCNC: 34.2 G/DL (ref 31.5–36.5)
MCV RBC AUTO: 90 FL (ref 78–100)
NONHDLC SERPL-MCNC: 153 MG/DL
PLATELET # BLD AUTO: 144 10E9/L (ref 150–450)
POTASSIUM SERPL-SCNC: 4.1 MMOL/L (ref 3.4–5.3)
PROT SERPL-MCNC: 7.4 G/DL (ref 6.8–8.8)
RBC # BLD AUTO: 4.44 10E12/L (ref 4.4–5.9)
SODIUM SERPL-SCNC: 140 MMOL/L (ref 133–144)
TRIGL SERPL-MCNC: 129 MG/DL
WBC # BLD AUTO: 6.3 10E9/L (ref 4–11)

## 2019-09-19 PROCEDURE — 87389 HIV-1 AG W/HIV-1&-2 AB AG IA: CPT | Performed by: INTERNAL MEDICINE

## 2019-09-19 PROCEDURE — 90471 IMMUNIZATION ADMIN: CPT | Performed by: INTERNAL MEDICINE

## 2019-09-19 PROCEDURE — 80061 LIPID PANEL: CPT | Performed by: INTERNAL MEDICINE

## 2019-09-19 PROCEDURE — 90682 RIV4 VACC RECOMBINANT DNA IM: CPT | Performed by: INTERNAL MEDICINE

## 2019-09-19 PROCEDURE — 36415 COLL VENOUS BLD VENIPUNCTURE: CPT | Performed by: INTERNAL MEDICINE

## 2019-09-19 PROCEDURE — 99396 PREV VISIT EST AGE 40-64: CPT | Mod: 25 | Performed by: INTERNAL MEDICINE

## 2019-09-19 PROCEDURE — G0103 PSA SCREENING: HCPCS | Performed by: INTERNAL MEDICINE

## 2019-09-19 PROCEDURE — 85027 COMPLETE CBC AUTOMATED: CPT | Performed by: INTERNAL MEDICINE

## 2019-09-19 PROCEDURE — 80053 COMPREHEN METABOLIC PANEL: CPT | Performed by: INTERNAL MEDICINE

## 2019-09-19 ASSESSMENT — MIFFLIN-ST. JEOR: SCORE: 1822.3

## 2019-09-19 NOTE — PROGRESS NOTES
SUBJECTIVE:   CC: Hayden Chowdary is an 64 year old male who presents for preventative health visit.     Healthy Habits:     Getting at least 3 servings of Calcium per day:  Yes    Bi-annual eye exam:  Yes    Dental care twice a year:  Yes    Sleep apnea or symptoms of sleep apnea:  None    Diet:  Regular (no restrictions)    Frequency of exercise:  6-7 days/week    Duration of exercise:  Greater than 60 minutes    Taking medications regularly:  Not Applicable    Barriers to taking medications:  None    Medication side effects:  None    PHQ-2 Total Score: 0    Additional concerns today:  No      Today's PHQ-2 Score:   PHQ-2 ( 1999 Pfizer) 9/19/2019   Q1: Little interest or pleasure in doing things 0   Q2: Feeling down, depressed or hopeless 0   PHQ-2 Score 0       Abuse: Current or Past(Physical, Sexual or Emotional)- No  Do you feel safe in your environment? Yes    Social History     Tobacco Use     Smoking status: Former Smoker     Types: Cigars     Smokeless tobacco: Never Used     Tobacco comment: in past has smoked cigarettes perhaps a pack a month   Substance Use Topics     Alcohol use: No     Comment: not for 20 years         Alcohol Use 7/10/2017   Prescreen: >3 drinks/day or >7 drinks/week? The patient does not drink >3 drinks per day nor >7 drinks per week.       Last PSA:   PSA   Date Value Ref Range Status   07/10/2017 2.74 0 - 4 ug/L Final     Comment:     Assay Method:  Chemiluminescence using Siemens Vista analyzer       Reviewed orders with patient. Reviewed health maintenance and updated orders accordingly - Yes  Patient Active Problem List   Diagnosis     Chronic atrial fibrillation (H)     Sarcoidosis     Primary osteoarthritis of both knees     Past Surgical History:   Procedure Laterality Date     BACK SURGERY  2008    fusion C5-6-7     C NONSPECIFIC PROCEDURE      pyloric stenosis repair     C NONSPECIFIC PROCEDURE      right inguinal hernia     C NONSPECIFIC PROCEDURE      bladder surgery      C NONSPECIFIC PROCEDURE      emergency appendectomy     EYE SURGERY Bilateral 2018       Social History     Tobacco Use     Smoking status: Former Smoker     Types: Cigars     Smokeless tobacco: Never Used     Tobacco comment: in past has smoked cigarettes perhaps a pack a month   Substance Use Topics     Alcohol use: No     Comment: not for 20 years     Family History   Problem Relation Age of Onset     Cancer Mother         colon cancer 73     Neurologic Disorder Father         dad had ALS,  at 60     Cardiovascular Maternal Grandfather      Arthritis Paternal Grandmother      Cancer Paternal Grandfather         lung cancer     Gastrointestinal Disease Sister      Cardiovascular Sister         whole in heart         Current Outpatient Medications   Medication Sig Dispense Refill     Naproxen Sodium (ALEVE PO) Take 220 mg by mouth daily as needed        Allergies   Allergen Reactions     Bee Venom Swelling     Does not carry Epi Pen      Contrast Dye Rash       Reviewed and updated as needed this visit by clinical staff  Tobacco  Allergies  Meds  Soc Hx        Reviewed and updated as needed this visit by Provider        Past Medical History:   Diagnosis Date     Atrial fibrillation (H)     s/p catheter ablation 2014     Cancer (H)     basal cell  removed from back x 2     Primary osteoarthritis of both knees 7/10/2017     Sarcoidosis 7/10/2017      Past Surgical History:   Procedure Laterality Date     BACK SURGERY      fusion C5-6-7     C NONSPECIFIC PROCEDURE      pyloric stenosis repair     C NONSPECIFIC PROCEDURE      right inguinal hernia     C NONSPECIFIC PROCEDURE      bladder surgery     C NONSPECIFIC PROCEDURE      emergency appendectomy     EYE SURGERY Bilateral 2018       Review of Systems  A 10 organ systems ROS is negative.     OBJECTIVE:   /65 (BP Location: Right arm, Patient Position: Chair, Cuff Size: Adult Regular)   Pulse 82   Temp 97.6  F (36.4  C) (Tympanic)   Ht  "1.88 m (6' 2\")   Wt 95.3 kg (210 lb)   SpO2 95%   BMI 26.96 kg/m      Physical Exam  GENERAL: healthy, alert and no distress  EYES: Eyes grossly normal to inspection, PERRL and conjunctivae and sclerae normal  HENT: ear canals and TM's normal, nose and mouth without ulcers or lesions  NECK: no adenopathy, no asymmetry, masses, or scars and thyroid normal to palpation  RESP: lungs clear to auscultation - no rales, rhonchi or wheezes  CV: regular rate and rhythm, normal S1 S2, no S3 or S4, no murmur, click or rub, no peripheral edema and peripheral pulses strong  ABDOMEN: soft, nontender, no hepatosplenomegaly, no masses and bowel sounds normal  RECTAL: normal sphincter tone, no rectal masses, prostate normal size, smooth, nontender without nodules or masses  MS: no gross musculoskeletal defects noted, no edema  SKIN: no suspicious lesions or rashes  NEURO: Normal strength and tone, mentation intact and speech normal  PSYCH: mentation appears normal, affect normal/bright    Diagnostic Test Results:  Labs pending     ASSESSMENT/PLAN:   1. Routine general medical examination at a health care facility    - Lipid panel reflex to direct LDL Fasting  - Comprehensive metabolic panel  - CBC with platelets    2. Paroxysmal atrial fibrillation (H)  No symptoms since successful ablation in     3. Sarcoidosis  This was diagnosed incidentally with chest x-ray associated with ablation   He never had any symptoms from this       4. Prostate cancer screening    - Prostate spec antigen screen    COUNSELING:   Reviewed preventive health counseling, as reflected in patient instructions  Special attention given to:        Regular exercise       Healthy diet/nutrition       Immunizations    Flu shot; shingrix recommended              Consider Hep C screening for patients born between 1945 and 1965; done        HIV screeninx in teen years, 1x in adult years, and at intervals if high risk; will do        Colon cancer screening; " "repeat in 2022       Prostate cancer screening; PSA today        Consider lung cancer screening for ages 55-80 years and 30 pack-year smoking history  ; quit smoking 25 years ago     Estimated body mass index is 26.96 kg/m  as calculated from the following:    Height as of this encounter: 1.88 m (6' 2\").    Weight as of this encounter: 95.3 kg (210 lb).     Weight management plan: Discussed healthy diet and exercise guidelines     reports that he has quit smoking. His smoking use included cigars. He has never used smokeless tobacco.      Counseling Resources:  ATP IV Guidelines  Pooled Cohorts Equation Calculator  FRAX Risk Assessment  ICSI Preventive Guidelines  Dietary Guidelines for Americans, 2010  USDA's MyPlate  ASA Prophylaxis  Lung CA Screening    Grayson Mae MD  [facility]  "

## 2019-09-20 LAB — PSA SERPL-ACNC: 3.37 UG/L (ref 0–4)

## 2019-09-21 NOTE — RESULT ENCOUNTER NOTE
The following letter pertains to your most recent diagnostic tests:    -Your prostate specific antigen (PSA) test result returned normal.     -Your HIV test is negative.     -Cholesterol looks a little better than last year.  Nice work.      -Liver and gallbladder tests are normal for you. (ALT,AST, Alk phos, bilirubin), kidney function is normal for you (Creatinine, GFR), Sodium is normal, Potassium is normal for you, Calcium is normal for you, Glucose (blood sugar) is normal for you.      -Your complete blood counts including your hemoglobin returned normal for you.           Bottom line:  Your lab results look stable and healthy.        Follow up:  Schedule an appointment for a physical examination with fasting blood tests in one year's time, or return sooner if new questions, symptoms or problems arise.     The 10-year ASCVD risk score (Belia JULIA Jr., et al., 2013) is: 7.5%    Values used to calculate the score:      Age: 64 years      Sex: Male      Is Non- : No      Diabetic: No      Tobacco smoker: No      Systolic Blood Pressure: 100 mmHg      Is BP treated: No      HDL Cholesterol: 40 mg/dL      Total Cholesterol: 193 mg/dL       Sincerely,    Dr. Mae

## 2019-09-29 ENCOUNTER — HEALTH MAINTENANCE LETTER (OUTPATIENT)
Age: 64
End: 2019-09-29

## 2020-10-16 ENCOUNTER — OFFICE VISIT (OUTPATIENT)
Dept: FAMILY MEDICINE | Facility: CLINIC | Age: 65
End: 2020-10-16
Payer: COMMERCIAL

## 2020-10-16 VITALS
BODY MASS INDEX: 27.46 KG/M2 | WEIGHT: 214 LBS | HEIGHT: 74 IN | SYSTOLIC BLOOD PRESSURE: 116 MMHG | DIASTOLIC BLOOD PRESSURE: 76 MMHG | TEMPERATURE: 98.2 F | OXYGEN SATURATION: 99 % | HEART RATE: 57 BPM

## 2020-10-16 DIAGNOSIS — M17.0 PRIMARY OSTEOARTHRITIS OF BOTH KNEES: ICD-10-CM

## 2020-10-16 DIAGNOSIS — D86.9 SARCOIDOSIS: ICD-10-CM

## 2020-10-16 DIAGNOSIS — I48.0 PAROXYSMAL ATRIAL FIBRILLATION (H): ICD-10-CM

## 2020-10-16 DIAGNOSIS — Z00.00 ROUTINE GENERAL MEDICAL EXAMINATION AT A HEALTH CARE FACILITY: Primary | ICD-10-CM

## 2020-10-16 DIAGNOSIS — Z12.5 PROSTATE CANCER SCREENING: ICD-10-CM

## 2020-10-16 LAB
ALBUMIN SERPL-MCNC: 3.9 G/DL (ref 3.4–5)
ALP SERPL-CCNC: 75 U/L (ref 40–150)
ALT SERPL W P-5'-P-CCNC: 29 U/L (ref 0–70)
ANION GAP SERPL CALCULATED.3IONS-SCNC: 5 MMOL/L (ref 3–14)
AST SERPL W P-5'-P-CCNC: 15 U/L (ref 0–45)
BILIRUB SERPL-MCNC: 0.5 MG/DL (ref 0.2–1.3)
BUN SERPL-MCNC: 20 MG/DL (ref 7–30)
CALCIUM SERPL-MCNC: 9 MG/DL (ref 8.5–10.1)
CHLORIDE SERPL-SCNC: 108 MMOL/L (ref 94–109)
CHOLEST SERPL-MCNC: 206 MG/DL
CO2 SERPL-SCNC: 26 MMOL/L (ref 20–32)
CREAT SERPL-MCNC: 0.96 MG/DL (ref 0.66–1.25)
ERYTHROCYTE [DISTWIDTH] IN BLOOD BY AUTOMATED COUNT: 12.6 % (ref 10–15)
GFR SERPL CREATININE-BSD FRML MDRD: 83 ML/MIN/{1.73_M2}
GLUCOSE SERPL-MCNC: 94 MG/DL (ref 70–99)
HCT VFR BLD AUTO: 41.2 % (ref 40–53)
HDLC SERPL-MCNC: 47 MG/DL
HGB BLD-MCNC: 13.8 G/DL (ref 13.3–17.7)
LDLC SERPL CALC-MCNC: 130 MG/DL
MCH RBC QN AUTO: 30.3 PG (ref 26.5–33)
MCHC RBC AUTO-ENTMCNC: 33.5 G/DL (ref 31.5–36.5)
MCV RBC AUTO: 90 FL (ref 78–100)
NONHDLC SERPL-MCNC: 159 MG/DL
PLATELET # BLD AUTO: 148 10E9/L (ref 150–450)
POTASSIUM SERPL-SCNC: 4.6 MMOL/L (ref 3.4–5.3)
PROT SERPL-MCNC: 7.8 G/DL (ref 6.8–8.8)
PSA SERPL-ACNC: 2.83 UG/L (ref 0–4)
RBC # BLD AUTO: 4.56 10E12/L (ref 4.4–5.9)
SODIUM SERPL-SCNC: 139 MMOL/L (ref 133–144)
TRIGL SERPL-MCNC: 143 MG/DL
WBC # BLD AUTO: 5.3 10E9/L (ref 4–11)

## 2020-10-16 PROCEDURE — 80053 COMPREHEN METABOLIC PANEL: CPT | Performed by: INTERNAL MEDICINE

## 2020-10-16 PROCEDURE — 36415 COLL VENOUS BLD VENIPUNCTURE: CPT | Performed by: INTERNAL MEDICINE

## 2020-10-16 PROCEDURE — G0103 PSA SCREENING: HCPCS | Performed by: INTERNAL MEDICINE

## 2020-10-16 PROCEDURE — 85027 COMPLETE CBC AUTOMATED: CPT | Performed by: INTERNAL MEDICINE

## 2020-10-16 PROCEDURE — 80061 LIPID PANEL: CPT | Performed by: INTERNAL MEDICINE

## 2020-10-16 PROCEDURE — 99396 PREV VISIT EST AGE 40-64: CPT | Performed by: INTERNAL MEDICINE

## 2020-10-16 RX ORDER — OMEGA-3 FATTY ACIDS/FISH OIL 300-1000MG
CAPSULE ORAL
COMMUNITY
Start: 2019-10-01 | End: 2020-10-16

## 2020-10-16 RX ORDER — CHOLECALCIFEROL (VITAMIN D3) 50 MCG
TABLET ORAL
COMMUNITY
Start: 2020-01-01 | End: 2023-02-27

## 2020-10-16 ASSESSMENT — MIFFLIN-ST. JEOR: SCORE: 1835.45

## 2020-10-16 NOTE — PROGRESS NOTES
SUBJECTIVE:   CC: Hayden Chowdary is an 65 year old male who presents for preventative health visit.       Patient has been advised of split billing requirements and indicates understanding: Yes  Healthy Habits:     Getting at least 3 servings of Calcium per day:  Yes    Bi-annual eye exam:  Yes    Dental care twice a year:  Yes    Sleep apnea or symptoms of sleep apnea:  None    Diet:  Regular (no restrictions)    Frequency of exercise:  6-7 days/week    Duration of exercise:  Greater than 60 minutes    Taking medications regularly:  Not Applicable    Medication side effects:  Not applicable    PHQ-2 Total Score: 0      Today's PHQ-2 Score:   PHQ-2 ( 1999 Pfizer) 10/12/2020   Q1: Little interest or pleasure in doing things 0   Q2: Feeling down, depressed or hopeless 0   PHQ-2 Score 0   Q1: Little interest or pleasure in doing things Not at all   Q2: Feeling down, depressed or hopeless Not at all   PHQ-2 Score 0       Abuse: Current or Past(Physical, Sexual or Emotional)- No  Do you feel safe in your environment? Yes        Social History     Tobacco Use     Smoking status: Former Smoker     Types: Cigars     Smokeless tobacco: Never Used     Tobacco comment: in past has smoked cigarettes perhaps a pack a month   Substance Use Topics     Alcohol use: No     Comment: not for 20 years     If you drink alcohol do you typically have >3 drinks per day or >7 drinks per week? Not applicable    Alcohol Use 10/16/2020   Prescreen: >3 drinks/day or >7 drinks/week? -   Prescreen: >3 drinks/day or >7 drinks/week? Not Applicable       Last PSA:   PSA   Date Value Ref Range Status   09/19/2019 3.37 0 - 4 ug/L Final     Comment:     Assay Method:  Chemiluminescence using Siemens Vista analyzer       Reviewed orders with patient. Reviewed health maintenance and updated orders accordingly - Yes  Patient Active Problem List   Diagnosis     Chronic atrial fibrillation (H)     Sarcoidosis     Primary osteoarthritis of both knees      Past Surgical History:   Procedure Laterality Date     BACK SURGERY  2008    fusion C5-6-7     EYE SURGERY Bilateral 2018     ZZC NONSPECIFIC PROCEDURE      pyloric stenosis repair     ZZC NONSPECIFIC PROCEDURE      right inguinal hernia     ZZC NONSPECIFIC PROCEDURE      bladder surgery     ZZC NONSPECIFIC PROCEDURE      emergency appendectomy       Social History     Tobacco Use     Smoking status: Former Smoker     Types: Cigars     Smokeless tobacco: Never Used     Tobacco comment: in past has smoked cigarettes perhaps a pack a month   Substance Use Topics     Alcohol use: No     Comment: not for 20 years     Family History   Problem Relation Age of Onset     Cancer Mother         colon cancer 73     Neurologic Disorder Father         dad had ALS,  at 60     Cardiovascular Maternal Grandfather      Arthritis Paternal Grandmother      Cancer Paternal Grandfather         lung cancer     Gastrointestinal Disease Sister      Cardiovascular Sister         whole in heart         Current Outpatient Medications   Medication Sig Dispense Refill     ibuprofen (ADVIL/MOTRIN) 200 MG capsule        Naproxen Sodium (ALEVE PO) Take 220 mg by mouth daily as needed        NONFORMULARY Thymus Complex Immune Support Supplement       vitamin D3 (CHOLECALCIFEROL) 50 mcg (2000 units) tablet        Allergies   Allergen Reactions     Bee Venom Swelling     Does not carry Epi Pen      Contrast Dye Rash       Reviewed and updated as needed this visit by clinical staff  Tobacco  Allergies  Meds              Reviewed and updated as needed this visit by Provider                Past Medical History:   Diagnosis Date     Atrial fibrillation (H)     s/p catheter ablation 2014     Cancer (H)     basal cell  removed from back x 2     Primary osteoarthritis of both knees 7/10/2017     Sarcoidosis 7/10/2017      Past Surgical History:   Procedure Laterality Date     BACK SURGERY      fusion C5-6-7     EYE SURGERY Bilateral 2018  "    ZZC NONSPECIFIC PROCEDURE      pyloric stenosis repair     ZZC NONSPECIFIC PROCEDURE      right inguinal hernia     ZZC NONSPECIFIC PROCEDURE      bladder surgery     ZZC NONSPECIFIC PROCEDURE      emergency appendectomy       Review of Systems  A 10 organ systems ROS is negative other than any pertinent positives or negatives previously stated.     OBJECTIVE:   /76 (BP Location: Right arm, Cuff Size: Adult Large)   Pulse 57   Temp 98.2  F (36.8  C) (Temporal)   Ht 1.88 m (6' 2\")   Wt 97.1 kg (214 lb)   SpO2 99%   BMI 27.48 kg/m      Physical Exam  GENERAL: healthy, alert and no distress  EYES: Eyes grossly normal to inspection, PERRL and conjunctivae and sclerae normal  HENT: ear canals and TM's normal, nose and mouth without ulcers or lesions  NECK: no adenopathy, no asymmetry, masses, or scars and thyroid normal to palpation  RESP: lungs clear to auscultation - no rales, rhonchi or wheezes  CV: regular rate and rhythm, normal S1 S2, no S3 or S4, no murmur, click or rub, no peripheral edema and peripheral pulses strong  ABDOMEN: soft, nontender, no hepatosplenomegaly, no masses and bowel sounds normal  RECTAL: normal sphincter tone, no rectal masses, prostate normal size, smooth, nontender without nodules or masses  MS: no gross musculoskeletal defects noted, no edema  SKIN: no suspicious lesions or rashes  NEURO: Normal strength and tone, mentation intact and speech normal  PSYCH: mentation appears normal, affect normal/bright    Labs pending     ASSESSMENT/PLAN:   1. Routine general medical examination at a health care facility    - Comprehensive metabolic panel  - CBC with platelets  - Lipid panel reflex to direct LDL Fasting    2. Paroxysmal atrial fibrillation (H)  No symptoms since ablation in 2014    3. Prostate cancer screening    - Prostate spec antigen screen    4. Sarcoidosis  Incidental finding; no cough or dyspnea symptoms     5. Primary osteoarthritis of both knees  He asked about " "pain management strategies for this       Patient has been advised of split billing requirements and indicates understanding: Yes  COUNSELING:   Reviewed preventive health counseling, as reflected in patient instructions  Special attention given to:        Regular exercise       Healthy diet/nutrition       Immunizations    Vaccines are up to date              HIV screeninx in teen years, 1x in adult years, and at intervals if high risk; done       Colon cancer screening; repeat in 2022       Prostate cancer screening; PSA       Consider lung cancer screening for ages 55-80 years and 30 pack-year smoking history  ; quit smoking 26 years ago     Estimated body mass index is 27.48 kg/m  as calculated from the following:    Height as of this encounter: 1.88 m (6' 2\").    Weight as of this encounter: 97.1 kg (214 lb).     Weight management plan: Discussed healthy diet and exercise guidelines    He reports that he has quit smoking. His smoking use included cigars. He has never used smokeless tobacco.      Counseling Resources:  ATP IV Guidelines  Pooled Cohorts Equation Calculator  FRAX Risk Assessment  ICSI Preventive Guidelines  Dietary Guidelines for Americans,   USDA's MyPlate  ASA Prophylaxis  Lung CA Screening    Grayson Mae MD  [facility]  "

## 2020-10-16 NOTE — LETTER
"[facility]  [address] [facility]  [address]  [place]  [address]  Tel: [phone]    October 19, 2020    Hayden OLVERA Ricarda  [address] [facility] [address]        Dear Mr. Chowdary,    The following letter pertains to your most recent diagnostic tests:     -Your prostate specific antigen (PSA) test result returned normal.     -Your total cholesterol is 206 which is above your goal of total cholesterol less than 200.     -Your triglycerides are 143 which are at your goal of triglycerides less than 150.     -Your HDL or \"good cholesterol\" is 47 which is at your goal of HDL cholesterol greater than 40.     -Your LDL cholesterol or \"bad cholesterol\" is 130 which is at your goal of LDL cholesterol less than <160.  Your LDL goal is based on your risk factors for artery disease.     -Liver and gallbladder tests are normal for you. (ALT,AST, Alk phos, bilirubin), kidney function is normal for you (Creatinine, GFR), Sodium is normal, Potassium is normal for you, Calcium is normal for you, Glucose (blood sugar) is normal for you.       -Your complete blood counts including your hemoglobin returned normal for you.         Bottom line:  Your cholesterol is a little higher than last year.  Not high enough to warrant medications to lower cholesterol, but I advise paying attention to your diet to improve cholesterol.  You can reduce your total and LDL cholesterol levels by eating less saturated fats.  This means you should eat less fried foods and meat.  If you eat meat, you should try to eat more chicken and fish and less beef or pork.  Also, you should increase dietary fiber intake by eating more fruits, vegetables and whole grains.  A diet high in fiber reduces total and LDL cholesterol levels.     The remainder of the lab results look healthy.         Follow up:  Schedule an appointment for a physical examination with fasting blood tests in one or two year's time, or return sooner if new questions, " symptoms or problems arise.       Sincerely,     Dr. Mae           Enclosure: Lab Results  Results for orders placed or performed in visit on 10/16/20   Comprehensive metabolic panel     Status: None   Result Value Ref Range    Sodium 139 133 - 144 mmol/L    Potassium 4.6 3.4 - 5.3 mmol/L    Chloride 108 94 - 109 mmol/L    Carbon Dioxide 26 20 - 32 mmol/L    Anion Gap 5 3 - 14 mmol/L    Glucose 94 70 - 99 mg/dL    Urea Nitrogen 20 7 - 30 mg/dL    Creatinine 0.96 0.66 - 1.25 mg/dL    GFR Estimate 83 >60 mL/min/[1.73_m2]    GFR Estimate If Black >90 >60 mL/min/[1.73_m2]    Calcium 9.0 8.5 - 10.1 mg/dL    Bilirubin Total 0.5 0.2 - 1.3 mg/dL    Albumin 3.9 3.4 - 5.0 g/dL    Protein Total 7.8 6.8 - 8.8 g/dL    Alkaline Phosphatase 75 40 - 150 U/L    ALT 29 0 - 70 U/L    AST 15 0 - 45 U/L   CBC with platelets     Status: Abnormal   Result Value Ref Range    WBC 5.3 4.0 - 11.0 10e9/L    RBC Count 4.56 4.4 - 5.9 10e12/L    Hemoglobin 13.8 13.3 - 17.7 g/dL    Hematocrit 41.2 40.0 - 53.0 %    MCV 90 78 - 100 fl    MCH 30.3 26.5 - 33.0 pg    MCHC 33.5 31.5 - 36.5 g/dL    RDW 12.6 10.0 - 15.0 %    Platelet Count 148 (L) 150 - 450 10e9/L   Lipid panel reflex to direct LDL Fasting     Status: Abnormal   Result Value Ref Range    Cholesterol 206 (H) <200 mg/dL    Triglycerides 143 <150 mg/dL    HDL Cholesterol 47 >39 mg/dL    LDL Cholesterol Calculated 130 (H) <100 mg/dL    Non HDL Cholesterol 159 (H) <130 mg/dL   Prostate spec antigen screen     Status: None   Result Value Ref Range    PSA 2.83 0 - 4 ug/L

## 2020-10-17 NOTE — RESULT ENCOUNTER NOTE
"The following letter pertains to your most recent diagnostic tests:    -Your prostate specific antigen (PSA) test result returned normal.     -Your total cholesterol is 206 which is above your goal of total cholesterol less than 200.    -Your triglycerides are 143 which are at your goal of triglycerides less than 150.    -Your HDL or \"good cholesterol\" is 47 which is at your goal of HDL cholesterol greater than 40.    -Your LDL cholesterol or \"bad cholesterol\" is 130 which is at your goal of LDL cholesterol less than <160.  Your LDL goal is based on your risk factors for artery disease.     -Liver and gallbladder tests are normal for you. (ALT,AST, Alk phos, bilirubin), kidney function is normal for you (Creatinine, GFR), Sodium is normal, Potassium is normal for you, Calcium is normal for you, Glucose (blood sugar) is normal for you.      -Your complete blood counts including your hemoglobin returned normal for you.         Bottom line:  Your cholesterol is a little higher than last year.  Not high enough to warrant medications to lower cholesterol, but I advise paying attention to your diet to improve cholesterol.  You can reduce your total and LDL cholesterol levels by eating less saturated fats.  This means you should eat less fried foods and meat.  If you eat meat, you should try to eat more chicken and fish and less beef or pork.  Also, you should increase dietary fiber intake by eating more fruits, vegetables and whole grains.  A diet high in fiber reduces total and LDL cholesterol levels.     The remainder of the lab results look healthy.        Follow up:  Schedule an appointment for a physical examination with fasting blood tests in one or two year's time, or return sooner if new questions, symptoms or problems arise.       Sincerely,    Dr. Mae    The 10-year ASCVD risk score (Dalenitin DUMONT Jr., et al., 2013) is: 9.9%    Values used to calculate the score:      Age: 65 years      Sex: Male      Is " Non- : No      Diabetic: No      Tobacco smoker: No      Systolic Blood Pressure: 116 mmHg      Is BP treated: No      HDL Cholesterol: 47 mg/dL      Total Cholesterol: 206 mg/dL

## 2021-11-09 ENCOUNTER — OFFICE VISIT (OUTPATIENT)
Dept: FAMILY MEDICINE | Facility: CLINIC | Age: 66
End: 2021-11-09
Payer: COMMERCIAL

## 2021-11-09 VITALS
TEMPERATURE: 96.8 F | WEIGHT: 215.2 LBS | HEIGHT: 74 IN | DIASTOLIC BLOOD PRESSURE: 76 MMHG | HEART RATE: 65 BPM | SYSTOLIC BLOOD PRESSURE: 109 MMHG | RESPIRATION RATE: 16 BRPM | BODY MASS INDEX: 27.62 KG/M2 | OXYGEN SATURATION: 97 %

## 2021-11-09 DIAGNOSIS — Z00.00 ROUTINE GENERAL MEDICAL EXAMINATION AT A HEALTH CARE FACILITY: Primary | ICD-10-CM

## 2021-11-09 DIAGNOSIS — I48.0 PAROXYSMAL ATRIAL FIBRILLATION (H): ICD-10-CM

## 2021-11-09 DIAGNOSIS — M17.0 PRIMARY OSTEOARTHRITIS OF BOTH KNEES: ICD-10-CM

## 2021-11-09 DIAGNOSIS — D86.9 SARCOIDOSIS: ICD-10-CM

## 2021-11-09 LAB
ALBUMIN SERPL-MCNC: 3.8 G/DL (ref 3.4–5)
ALP SERPL-CCNC: 73 U/L (ref 40–150)
ALT SERPL W P-5'-P-CCNC: 26 U/L (ref 0–70)
ANION GAP SERPL CALCULATED.3IONS-SCNC: 6 MMOL/L (ref 3–14)
AST SERPL W P-5'-P-CCNC: 15 U/L (ref 0–45)
BILIRUB SERPL-MCNC: 0.6 MG/DL (ref 0.2–1.3)
BUN SERPL-MCNC: 17 MG/DL (ref 7–30)
CALCIUM SERPL-MCNC: 8.5 MG/DL (ref 8.5–10.1)
CHLORIDE BLD-SCNC: 107 MMOL/L (ref 94–109)
CHOLEST SERPL-MCNC: 204 MG/DL
CO2 SERPL-SCNC: 25 MMOL/L (ref 20–32)
CREAT SERPL-MCNC: 1.03 MG/DL (ref 0.66–1.25)
FASTING STATUS PATIENT QL REPORTED: YES
GFR SERPL CREATININE-BSD FRML MDRD: 76 ML/MIN/1.73M2
GLUCOSE BLD-MCNC: 97 MG/DL (ref 70–99)
HDLC SERPL-MCNC: 42 MG/DL
LDLC SERPL CALC-MCNC: 139 MG/DL
NONHDLC SERPL-MCNC: 162 MG/DL
POTASSIUM BLD-SCNC: 4.5 MMOL/L (ref 3.4–5.3)
PROT SERPL-MCNC: 7.5 G/DL (ref 6.8–8.8)
PSA SERPL-MCNC: 3.19 UG/L (ref 0–4)
SODIUM SERPL-SCNC: 138 MMOL/L (ref 133–144)
TRIGL SERPL-MCNC: 113 MG/DL

## 2021-11-09 PROCEDURE — 80053 COMPREHEN METABOLIC PANEL: CPT | Performed by: INTERNAL MEDICINE

## 2021-11-09 PROCEDURE — 99396 PREV VISIT EST AGE 40-64: CPT | Performed by: INTERNAL MEDICINE

## 2021-11-09 PROCEDURE — 36415 COLL VENOUS BLD VENIPUNCTURE: CPT | Performed by: INTERNAL MEDICINE

## 2021-11-09 PROCEDURE — 80061 LIPID PANEL: CPT | Performed by: INTERNAL MEDICINE

## 2021-11-09 PROCEDURE — G0103 PSA SCREENING: HCPCS | Performed by: INTERNAL MEDICINE

## 2021-11-09 RX ORDER — ACETAMINOPHEN 500 MG
500-1000 TABLET ORAL EVERY 6 HOURS PRN
COMMUNITY

## 2021-11-09 SDOH — ECONOMIC STABILITY: FOOD INSECURITY: WITHIN THE PAST 12 MONTHS, YOU WORRIED THAT YOUR FOOD WOULD RUN OUT BEFORE YOU GOT MONEY TO BUY MORE.: NEVER TRUE

## 2021-11-09 SDOH — ECONOMIC STABILITY: INCOME INSECURITY: HOW HARD IS IT FOR YOU TO PAY FOR THE VERY BASICS LIKE FOOD, HOUSING, MEDICAL CARE, AND HEATING?: NOT HARD AT ALL

## 2021-11-09 SDOH — ECONOMIC STABILITY: TRANSPORTATION INSECURITY
IN THE PAST 12 MONTHS, HAS LACK OF TRANSPORTATION KEPT YOU FROM MEETINGS, WORK, OR FROM GETTING THINGS NEEDED FOR DAILY LIVING?: NO

## 2021-11-09 SDOH — ECONOMIC STABILITY: FOOD INSECURITY: WITHIN THE PAST 12 MONTHS, THE FOOD YOU BOUGHT JUST DIDN'T LAST AND YOU DIDN'T HAVE MONEY TO GET MORE.: NEVER TRUE

## 2021-11-09 SDOH — ECONOMIC STABILITY: INCOME INSECURITY: IN THE LAST 12 MONTHS, WAS THERE A TIME WHEN YOU WERE NOT ABLE TO PAY THE MORTGAGE OR RENT ON TIME?: NO

## 2021-11-09 SDOH — HEALTH STABILITY: PHYSICAL HEALTH: ON AVERAGE, HOW MANY MINUTES DO YOU ENGAGE IN EXERCISE AT THIS LEVEL?: 60 MIN

## 2021-11-09 SDOH — HEALTH STABILITY: PHYSICAL HEALTH: ON AVERAGE, HOW MANY DAYS PER WEEK DO YOU ENGAGE IN MODERATE TO STRENUOUS EXERCISE (LIKE A BRISK WALK)?: 5 DAYS

## 2021-11-09 SDOH — ECONOMIC STABILITY: TRANSPORTATION INSECURITY
IN THE PAST 12 MONTHS, HAS THE LACK OF TRANSPORTATION KEPT YOU FROM MEDICAL APPOINTMENTS OR FROM GETTING MEDICATIONS?: NO

## 2021-11-09 ASSESSMENT — ENCOUNTER SYMPTOMS
DYSURIA: 0
MYALGIAS: 1
CONSTIPATION: 0
NERVOUS/ANXIOUS: 0
SHORTNESS OF BREATH: 0
ARTHRALGIAS: 1
DIZZINESS: 0
CHILLS: 0
HEMATURIA: 0
EYE PAIN: 0
PARESTHESIAS: 1
DIARRHEA: 0
ABDOMINAL PAIN: 0
WEAKNESS: 0
JOINT SWELLING: 1
FREQUENCY: 0
HEADACHES: 0
COUGH: 0
NAUSEA: 0
PALPITATIONS: 0
SORE THROAT: 0
FEVER: 0
HEMATOCHEZIA: 0
HEARTBURN: 0

## 2021-11-09 ASSESSMENT — SOCIAL DETERMINANTS OF HEALTH (SDOH)
HOW OFTEN DO YOU GET TOGETHER WITH FRIENDS OR RELATIVES?: TWICE A WEEK
DO YOU BELONG TO ANY CLUBS OR ORGANIZATIONS SUCH AS CHURCH GROUPS UNIONS, FRATERNAL OR ATHLETIC GROUPS, OR SCHOOL GROUPS?: NO
IN A TYPICAL WEEK, HOW MANY TIMES DO YOU TALK ON THE PHONE WITH FAMILY, FRIENDS, OR NEIGHBORS?: NEVER
HOW OFTEN DO YOU ATTEND CHURCH OR RELIGIOUS SERVICES?: MORE THAN 4 TIMES PER YEAR

## 2021-11-09 ASSESSMENT — MIFFLIN-ST. JEOR: SCORE: 1827.95

## 2021-11-09 ASSESSMENT — LIFESTYLE VARIABLES
HOW MANY STANDARD DRINKS CONTAINING ALCOHOL DO YOU HAVE ON A TYPICAL DAY: PATIENT DECLINED
HOW OFTEN DO YOU HAVE SIX OR MORE DRINKS ON ONE OCCASION: NEVER
HOW OFTEN DO YOU HAVE A DRINK CONTAINING ALCOHOL: NEVER

## 2021-11-09 NOTE — PROGRESS NOTES
SUBJECTIVE:   CC: Hayden Chowdary is an 66 year old male who presents for preventative health visit.       Patient has been advised of split billing requirements and indicates understanding: Yes  Healthy Habits:    Getting at least 3 servings of Calcium per day:  Yes    Bi-annual eye exam:  Yes    Dental care twice a year:  Yes    Sleep apnea or symptoms of sleep apnea:  None    Diet:  Regular (no restrictions)    Frequency of exercise:  4-5 days/week    Duration of exercise:  30-45 minutes    Taking medications regularly:  Yes    Medication side effects:  None    PHQ-2 Total Score:    Additional concerns today:  No              Today's PHQ-2 Score:   PHQ-2 ( 1999 Pfizer) 11/9/2021   Q1: Little interest or pleasure in doing things 0   Q2: Feeling down, depressed or hopeless 0   PHQ-2 Score 0   Q1: Little interest or pleasure in doing things Not at all   Q2: Feeling down, depressed or hopeless Not at all   PHQ-2 Score 0       Abuse: Current or Past(Physical, Sexual or Emotional)- No  Do you feel safe in your environment? Yes  Has completed form, Will bring in [place] health care directive.      Social History     Tobacco Use     Smoking status: Former Smoker     Types: Cigars     Smokeless tobacco: Never Used     Tobacco comment: in past has smoked cigarettes perhaps a pack a month   Substance Use Topics     Alcohol use: No     Comment: not for 20 years     If you drink alcohol do you typically have >3 drinks per day or >7 drinks per week? No    Alcohol Use 11/9/2021   Prescreen: >3 drinks/day or >7 drinks/week? Not Applicable   Prescreen: >3 drinks/day or >7 drinks/week? -       Last PSA:   PSA   Date Value Ref Range Status   10/16/2020 2.83 0 - 4 ug/L Final     Comment:     Assay Method:  Chemiluminescence using Siemens Vista analyzer       Reviewed orders with patient. Reviewed health maintenance and updated orders accordingly - Yes  Patient Active Problem List   Diagnosis     Chronic atrial fibrillation (H)      Sarcoidosis     Primary osteoarthritis of both knees     Past Surgical History:   Procedure Laterality Date     BACK SURGERY      fusion C5-6-7     EYE SURGERY Bilateral 2018     Z NONSPECIFIC PROCEDURE      pyloric stenosis repair     Z NONSPECIFIC PROCEDURE      right inguinal hernia     ZZC NONSPECIFIC PROCEDURE      bladder surgery     ZC NONSPECIFIC PROCEDURE      emergency appendectomy       Social History     Tobacco Use     Smoking status: Former Smoker     Types: Cigars     Smokeless tobacco: Never Used     Tobacco comment: in past has smoked cigarettes perhaps a pack a month   Substance Use Topics     Alcohol use: No     Comment: not for 20 years     Family History   Problem Relation Age of Onset     Cancer Mother         colon cancer 73     Neurologic Disorder Father         dad had ALS,  at 60     Cardiovascular Maternal Grandfather      Arthritis Paternal Grandmother      Cancer Paternal Grandfather         lung cancer     Gastrointestinal Disease Sister      Cardiovascular Sister         whole in heart         Current Outpatient Medications   Medication Sig Dispense Refill     acetaminophen (TYLENOL) 500 MG tablet Take 500-1,000 mg by mouth every 6 hours as needed for mild pain       Naproxen Sodium (ALEVE PO) Take 220 mg by mouth daily as needed        vitamin D3 (CHOLECALCIFEROL) 50 mcg (2000 units) tablet        Allergies   Allergen Reactions     Bee Venom Swelling     Does not carry Epi Pen      Contrast Dye Rash       Reviewed and updated as needed this visit by clinical staff  Tobacco  Allergies  Meds             Reviewed and updated as needed this visit by Provider               Past Medical History:   Diagnosis Date     Atrial fibrillation (H)     s/p catheter ablation 2014     Cancer (H)     basal cell  removed from back x 2     Primary osteoarthritis of both knees 7/10/2017     Sarcoidosis 7/10/2017      Past Surgical History:   Procedure Laterality Date     BACK SURGERY   2008    fusion C5-6-7     EYE SURGERY Bilateral 02/2018     Z NONSPECIFIC PROCEDURE      pyloric stenosis repair     ZZC NONSPECIFIC PROCEDURE      right inguinal hernia     ZZC NONSPECIFIC PROCEDURE      bladder surgery     Z NONSPECIFIC PROCEDURE      emergency appendectomy       Review of Systems   Constitutional: Negative for chills and fever.   HENT: Negative for congestion, ear pain, hearing loss and sore throat.    Eyes: Negative for pain and visual disturbance.   Respiratory: Negative for cough and shortness of breath.    Cardiovascular: Negative for chest pain, palpitations and peripheral edema.   Gastrointestinal: Negative for abdominal pain, constipation, diarrhea, heartburn, hematochezia and nausea.   Genitourinary: Negative for dysuria, frequency, genital sores, hematuria and urgency.   Musculoskeletal: Positive for arthralgias, joint swelling and myalgias.   Skin: Negative for rash.   Neurological: Positive for paresthesias. Negative for dizziness, weakness and headaches.   Psychiatric/Behavioral: Negative for mood changes. The patient is not nervous/anxious.      All above symptoms are chronic     OBJECTIVE:   There were no vitals taken for this visit.    Physical Exam  GENERAL: healthy, alert and no distress  EYES: Eyes grossly normal to inspection, PERRL and conjunctivae and sclerae normal  HENT: ear canals and TM's normal  NECK: no adenopathy, no asymmetry, masses, or scars and thyroid normal to palpation  RESP: lungs clear to auscultation - no rales, rhonchi or wheezes  CV: regular rate and rhythm, normal S1 S2, no S3 or S4, no murmur, click or rub, no peripheral edema and peripheral pulses strong  ABDOMEN: soft, nontender, no hepatosplenomegaly, no masses and bowel sounds normal  RECTAL: normal sphincter tone, no rectal masses, prostate normal size, smooth, nontender without nodules or masses  MS: no gross musculoskeletal defects noted, no edema  SKIN: no suspicious lesions or rashes  NEURO:  "Normal strength and tone, mentation intact and speech normal  PSYCH: mentation appears normal, affect normal/bright    Labs pending     ASSESSMENT/PLAN:   (Z00.00) Routine general medical examination at a health care facility  (primary encounter diagnosis)  Comment:   Plan: Lipid panel reflex to direct LDL Fasting,         Comprehensive metabolic panel, PROSTATE SPEC         ANTIGEN SCREEN            (I48.0) Paroxysmal atrial fibrillation (H)  Comment: post ablation      (D86.9) Sarcoidosis  Comment: no current symptoms     (M17.0) Primary osteoarthritis of both knees  Comment: He had a lot of questions about this; we discussed symptom management strategies       Patient has been advised of split billing requirements and indicates understanding: Yes  COUNSELING:   Reviewed preventive health counseling, as reflected in patient instructions  Special attention given to:        Regular exercise       Healthy diet/nutrition       Immunizations      Vaccines are up to date            Consider Hep C screening for all patients one time for ages 18-79 years       HIV screeninx in teen years, 1x in adult years, and at intervals if high risk       Colon cancer screening; repeat 2022       Prostate cancer screening; PSA    Estimated body mass index is 27.48 kg/m  as calculated from the following:    Height as of 10/16/20: 1.88 m (6' 2\").    Weight as of 10/16/20: 97.1 kg (214 lb).     Weight management plan: Discussed healthy diet and exercise guidelines    He reports that he has quit smoking. His smoking use included cigars. He has never used smokeless tobacco.  Less than 30 pack year history; quit smoking 20-30 years ago     Counseling Resources:  ATP IV Guidelines  Pooled Cohorts Equation Calculator  FRAX Risk Assessment  ICSI Preventive Guidelines  Dietary Guidelines for Americans, 2010  USDA's MyPlate  ASA Prophylaxis  Lung CA Screening    Grayson Mae MD  [facility]  "

## 2021-11-09 NOTE — LETTER
November 10, 2021      Hayden Chowdary  [address] [facility] [address]        Dear ,    We are writing to inform you of your test results.    The following letter pertains to your most recent diagnostic tests:     -Your prostate specific antigen (PSA) test result returned normal.     -The cholesterol has worsened slightly since last check.     -Liver and gallbladder tests are normal for you. (ALT,AST, Alk phos, bilirubin), kidney function is normal for you (Creatinine, GFR), Sodium is normal, Potassium is normal for you, Calcium is normal for you, Glucose (blood sugar) is normal for you.         Bottom line: Overall, the labs have not changed much since last check.  The cholesterol is a tiny bit worse.  I think you have room to keep working on your diet to improve cholesterol.  You can reduce your total and LDL cholesterol levels by eating less saturated fats.  This means you should eat less fried foods and meat.  If you eat meat, you should try to eat more chicken and fish and less beef or pork.  Also, you should increase dietary fiber intake by eating more fruits, vegetables and whole grains.  A diet high in fiber reduces total and LDL cholesterol levels.  We can recheck in 1 year       Follow up:  Schedule an appointment for a physical examination with fasting blood tests in one year's time, or return sooner if new questions, symptoms or problems arise.       Resulted Orders   Lipid panel reflex to direct LDL Fasting   Result Value Ref Range    Cholesterol 204 (H) <200 mg/dL    Triglycerides 113 <150 mg/dL    Direct Measure HDL 42 >=40 mg/dL    LDL Cholesterol Calculated 139 (H) <=100 mg/dL    Non HDL Cholesterol 162 (H) <130 mg/dL    Patient Fasting > 8hrs? Yes     Narrative    Cholesterol  Desirable:  <200 mg/dL    Triglycerides  Normal:  Less than 150 mg/dL  Borderline High:  150-199 mg/dL  High:  200-499 mg/dL  Very High:  Greater than or equal to 500 mg/dL    Direct Measure  HDL  Female:  Greater than or equal to 50 mg/dL   Male:  Greater than or equal to 40 mg/dL    LDL Cholesterol  Desirable:  <100mg/dL  Above Desirable:  100-129 mg/dL   Borderline High:  130-159 mg/dL   High:  160-189 mg/dL   Very High:  >= 190 mg/dL    Non HDL Cholesterol  Desirable:  130 mg/dL  Above Desirable:  130-159 mg/dL  Borderline High:  160-189 mg/dL  High:  190-219 mg/dL  Very High:  Greater than or equal to 220 mg/dL   Comprehensive metabolic panel   Result Value Ref Range    Sodium 138 133 - 144 mmol/L    Potassium 4.5 3.4 - 5.3 mmol/L    Chloride 107 94 - 109 mmol/L    Carbon Dioxide (CO2) 25 20 - 32 mmol/L    Anion Gap 6 3 - 14 mmol/L    Urea Nitrogen 17 7 - 30 mg/dL    Creatinine 1.03 0.66 - 1.25 mg/dL    Calcium 8.5 8.5 - 10.1 mg/dL    Glucose 97 70 - 99 mg/dL    Alkaline Phosphatase 73 40 - 150 U/L    AST 15 0 - 45 U/L    ALT 26 0 - 70 U/L    Protein Total 7.5 6.8 - 8.8 g/dL    Albumin 3.8 3.4 - 5.0 g/dL    Bilirubin Total 0.6 0.2 - 1.3 mg/dL    GFR Estimate 76 >60 mL/min/1.73m2      Comment:      As of July 11, 2021, eGFR is calculated by the CKD-EPI creatinine equation, without race adjustment. eGFR can be influenced by muscle mass, exercise, and diet. The reported eGFR is an estimation only and is only applicable if the renal function is stable.   PROSTATE SPEC ANTIGEN SCREEN   Result Value Ref Range    Prostate Specific Antigen Screen 3.19 0.00 - 4.00 ug/L       If you have any questions or concerns, please call the clinic at the number listed above.       Sincerely,      Grayson Mae MD

## 2021-11-09 NOTE — PATIENT INSTRUCTIONS
Try over the counter VOLTAREN (Diclofenac) gel applied up to four times daily for knee pain.

## 2021-11-10 NOTE — RESULT ENCOUNTER NOTE
The following letter pertains to your most recent diagnostic tests:    -Your prostate specific antigen (PSA) test result returned normal.     -The cholesterol has worsened slightly since last check.    -Liver and gallbladder tests are normal for you. (ALT,AST, Alk phos, bilirubin), kidney function is normal for you (Creatinine, GFR), Sodium is normal, Potassium is normal for you, Calcium is normal for you, Glucose (blood sugar) is normal for you.        Bottom line: Overall, the labs have not changed much since last check.  The cholesterol is a tiny bit worse.  I think you have room to keep working on your diet to improve cholesterol.  You can reduce your total and LDL cholesterol levels by eating less saturated fats.  This means you should eat less fried foods and meat.  If you eat meat, you should try to eat more chicken and fish and less beef or pork.  Also, you should increase dietary fiber intake by eating more fruits, vegetables and whole grains.  A diet high in fiber reduces total and LDL cholesterol levels.  We can recheck in 1 year      Follow up:  Schedule an appointment for a physical examination with fasting blood tests in one year's time, or return sooner if new questions, symptoms or problems arise.        Sincerely,    Dr. Mae

## 2022-09-22 ENCOUNTER — IMMUNIZATION (OUTPATIENT)
Dept: FAMILY MEDICINE | Facility: CLINIC | Age: 67
End: 2022-09-22
Payer: MEDICARE

## 2022-09-22 DIAGNOSIS — Z23 NEEDS FLU SHOT: Primary | ICD-10-CM

## 2022-09-22 PROCEDURE — G0008 ADMIN INFLUENZA VIRUS VAC: HCPCS

## 2022-09-22 PROCEDURE — 90662 IIV NO PRSV INCREASED AG IM: CPT

## 2022-09-22 PROCEDURE — 99207 PR NO CHARGE NURSE ONLY: CPT

## 2022-09-22 NOTE — PROGRESS NOTES
Prior to immunization administration, verified patients identity using patient s name and date of birth. Please see Immunization Activity for additional information.     Screening Questionnaire for Adult Immunization    Are you sick today?   No   Do you have allergies to medications, food, a vaccine component or latex?   No   Have you ever had a serious reaction after receiving a vaccination?   No   Do you have a long-term health problem with heart, lung, kidney, or metabolic disease (e.g., diabetes), asthma, a blood disorder, no spleen, complement component deficiency, a cochlear implant, or a spinal fluid leak?  Are you on long-term aspirin therapy?   No   Do you have cancer, leukemia, HIV/AIDS, or any other immune system problem?   No   Do you have a parent, brother, or sister with an immune system problem?   No   In the past 3 months, have you taken medications that affect  your immune system, such as prednisone, other steroids, or anticancer drugs; drugs for the treatment of rheumatoid arthritis, Crohn s disease, or psoriasis; or have you had radiation treatments?   No   Have you had a seizure, or a brain or other nervous system problem?   No   During the past year, have you received a transfusion of blood or blood    products, or been given immune (gamma) globulin or antiviral drug?   No   For women: Are you pregnant or is there a chance you could become       pregnant during the next month?   No   Have you received any vaccinations in the past 4 weeks?   No     Immunization questionnaire answers were all negative.        Per orders of Dr. Velasquez, injection of High dose flu given by Rox Beach MA. Patient instructed to remain in clinic for 15 minutes afterwards, and to report any adverse reaction to me immediately.       Screening performed by Rox Beach MA on 9/22/2022 at 10:56 AM.

## 2022-10-26 ENCOUNTER — TRANSFERRED RECORDS (OUTPATIENT)
Dept: HEALTH INFORMATION MANAGEMENT | Facility: CLINIC | Age: 67
End: 2022-10-26

## 2022-12-27 ASSESSMENT — ENCOUNTER SYMPTOMS
WEAKNESS: 0
DIARRHEA: 0
NAUSEA: 0
HEMATURIA: 0
FREQUENCY: 0
NERVOUS/ANXIOUS: 0
HEARTBURN: 0
HEMATOCHEZIA: 0
CHILLS: 0
CONSTIPATION: 0
EYE PAIN: 0
DIZZINESS: 0
JOINT SWELLING: 0
PALPITATIONS: 0
COUGH: 0
HEADACHES: 0
PARESTHESIAS: 0
ARTHRALGIAS: 1
MYALGIAS: 1
SORE THROAT: 0
ABDOMINAL PAIN: 0
SHORTNESS OF BREATH: 0
DYSURIA: 0
FEVER: 0

## 2022-12-27 ASSESSMENT — ACTIVITIES OF DAILY LIVING (ADL): CURRENT_FUNCTION: NO ASSISTANCE NEEDED

## 2023-01-02 ENCOUNTER — OFFICE VISIT (OUTPATIENT)
Dept: FAMILY MEDICINE | Facility: CLINIC | Age: 68
End: 2023-01-02
Payer: MEDICARE

## 2023-01-02 VITALS
TEMPERATURE: 97.7 F | HEART RATE: 74 BPM | OXYGEN SATURATION: 98 % | DIASTOLIC BLOOD PRESSURE: 75 MMHG | BODY MASS INDEX: 27.99 KG/M2 | WEIGHT: 218.1 LBS | SYSTOLIC BLOOD PRESSURE: 111 MMHG | HEIGHT: 74 IN | RESPIRATION RATE: 18 BRPM

## 2023-01-02 DIAGNOSIS — Z13.6 SCREENING FOR AAA (AORTIC ABDOMINAL ANEURYSM): ICD-10-CM

## 2023-01-02 DIAGNOSIS — Z12.5 PROSTATE CANCER SCREENING: ICD-10-CM

## 2023-01-02 DIAGNOSIS — Z00.00 ROUTINE GENERAL MEDICAL EXAMINATION AT A HEALTH CARE FACILITY: Primary | ICD-10-CM

## 2023-01-02 DIAGNOSIS — I48.0 PAROXYSMAL ATRIAL FIBRILLATION (H): ICD-10-CM

## 2023-01-02 LAB
ALBUMIN SERPL BCG-MCNC: 4.5 G/DL (ref 3.5–5.2)
ALP SERPL-CCNC: 68 U/L (ref 40–129)
ALT SERPL W P-5'-P-CCNC: 21 U/L (ref 10–50)
ANION GAP SERPL CALCULATED.3IONS-SCNC: 12 MMOL/L (ref 7–15)
AST SERPL W P-5'-P-CCNC: 27 U/L (ref 10–50)
BILIRUB SERPL-MCNC: 0.6 MG/DL
BUN SERPL-MCNC: 17.7 MG/DL (ref 8–23)
CALCIUM SERPL-MCNC: 9.4 MG/DL (ref 8.8–10.2)
CHLORIDE SERPL-SCNC: 103 MMOL/L (ref 98–107)
CHOLEST SERPL-MCNC: 241 MG/DL
CREAT SERPL-MCNC: 1.1 MG/DL (ref 0.67–1.17)
DEPRECATED HCO3 PLAS-SCNC: 25 MMOL/L (ref 22–29)
ERYTHROCYTE [DISTWIDTH] IN BLOOD BY AUTOMATED COUNT: 12 % (ref 10–15)
GFR SERPL CREATININE-BSD FRML MDRD: 74 ML/MIN/1.73M2
GLUCOSE SERPL-MCNC: 89 MG/DL (ref 70–99)
HCT VFR BLD AUTO: 41.8 % (ref 40–53)
HDLC SERPL-MCNC: 47 MG/DL
HGB BLD-MCNC: 13.6 G/DL (ref 13.3–17.7)
LDLC SERPL CALC-MCNC: 170 MG/DL
MCH RBC QN AUTO: 28.9 PG (ref 26.5–33)
MCHC RBC AUTO-ENTMCNC: 32.5 G/DL (ref 31.5–36.5)
MCV RBC AUTO: 89 FL (ref 78–100)
NONHDLC SERPL-MCNC: 194 MG/DL
PLATELET # BLD AUTO: 148 10E3/UL (ref 150–450)
POTASSIUM SERPL-SCNC: 4 MMOL/L (ref 3.4–5.3)
PROT SERPL-MCNC: 7.4 G/DL (ref 6.4–8.3)
PSA SERPL-MCNC: 4.6 NG/ML (ref 0–4.5)
RBC # BLD AUTO: 4.7 10E6/UL (ref 4.4–5.9)
SODIUM SERPL-SCNC: 140 MMOL/L (ref 136–145)
TRIGL SERPL-MCNC: 119 MG/DL
WBC # BLD AUTO: 7.2 10E3/UL (ref 4–11)

## 2023-01-02 PROCEDURE — 80061 LIPID PANEL: CPT | Performed by: INTERNAL MEDICINE

## 2023-01-02 PROCEDURE — 80053 COMPREHEN METABOLIC PANEL: CPT | Performed by: INTERNAL MEDICINE

## 2023-01-02 PROCEDURE — 85027 COMPLETE CBC AUTOMATED: CPT | Performed by: INTERNAL MEDICINE

## 2023-01-02 PROCEDURE — 36415 COLL VENOUS BLD VENIPUNCTURE: CPT | Performed by: INTERNAL MEDICINE

## 2023-01-02 PROCEDURE — G0103 PSA SCREENING: HCPCS | Performed by: INTERNAL MEDICINE

## 2023-01-02 PROCEDURE — G0402 INITIAL PREVENTIVE EXAM: HCPCS | Performed by: INTERNAL MEDICINE

## 2023-01-02 RX ORDER — ASCORBIC ACID 100 MG
TABLET,CHEWABLE ORAL
COMMUNITY
End: 2023-02-27

## 2023-01-02 ASSESSMENT — ENCOUNTER SYMPTOMS
FEVER: 0
CHILLS: 0
DYSURIA: 0
NERVOUS/ANXIOUS: 0
JOINT SWELLING: 0
NAUSEA: 0
DIARRHEA: 0
EYE PAIN: 0
CONSTIPATION: 0
SORE THROAT: 0
FREQUENCY: 0
HEARTBURN: 0
HEADACHES: 0
ARTHRALGIAS: 1
COUGH: 0
PARESTHESIAS: 0
DIZZINESS: 0
HEMATURIA: 0
HEMATOCHEZIA: 0
PALPITATIONS: 0
ABDOMINAL PAIN: 0
MYALGIAS: 1
SHORTNESS OF BREATH: 0
WEAKNESS: 0

## 2023-01-02 ASSESSMENT — PAIN SCALES - GENERAL: PAINLEVEL: NO PAIN (0)

## 2023-01-02 ASSESSMENT — ACTIVITIES OF DAILY LIVING (ADL): CURRENT_FUNCTION: NO ASSISTANCE NEEDED

## 2023-01-02 NOTE — PROGRESS NOTES
"SUBJECTIVE:   Deep is a 67 year old who presents for Preventive Visit.  Patient has been advised of split billing requirements and indicates understanding: Yes  Are you in the first 12 months of your Medicare coverage?  Yes,  Visual Acuity:  Right Eye: 20/32   Left Eye: 10/8  Both Eyes: 10/10    Healthy Habits:     In general, how would you rate your overall health?  Excellent    Frequency of exercise:  4-5 days/week    Duration of exercise:  45-60 minutes    Do you usually eat at least 4 servings of fruit and vegetables a day, include whole grains    & fiber and avoid regularly eating high fat or \"junk\" foods?  No    Taking medications regularly:  Yes    Medication side effects:  None    Ability to successfully perform activities of daily living:  No assistance needed    Home Safety:  No safety concerns identified    Hearing Impairment:  Difficulty following a conversation in a noisy restaurant or crowded room    In the past 6 months, have you been bothered by leaking of urine?  No    In general, how would you rate your overall mental or emotional health?  Excellent      PHQ-2 Total Score: 0    Additional concerns today:  No      Have you ever done Advance Care Planning? (For example, a Health Directive, POLST, or a discussion with a medical provider or your loved ones about your wishes): Yes, advance care planning is on file.       Fall risk  Fallen 2 or more times in the past year?: No  Any fall with injury in the past year?: No  click delete button to remove this line now  Cognitive Screening   1) Repeat 3 items (Leader, Season, Table)    2) Clock draw: NORMAL  3) 3 item recall: Recalls 3 objects  Results: 3 items recalled: COGNITIVE IMPAIRMENT LESS LIKELY    Mini-CogTM Copyright LAKESHIA Klein. Licensed by the author for use in [facility]; reprinted with permission (carlos@.[facility]). All rights reserved.      Do you have sleep apnea, excessive snoring or daytime drowsiness?: no    Reviewed and updated as " needed this visit by clinical staff    Allergies               Reviewed and updated as needed this visit by Provider                 Social History     Tobacco Use     Smoking status: Former     Types: Cigars     Smokeless tobacco: Never     Tobacco comments:     in past has smoked cigarettes perhaps a pack a month   Substance Use Topics     Alcohol use: No     Comment: not for 20 years         Alcohol Use 12/27/2022   Prescreen: >3 drinks/day or >7 drinks/week? Not Applicable   Prescreen: >3 drinks/day or >7 drinks/week? -               Current providers sharing in care for this patient include:   Patient Care Team:  Grayson Mea MD as PCP - General (Internal Medicine)    The following health maintenance items are reviewed in Epic and correct as of today:  Health Maintenance   Topic Date Due     LUNG CANCER SCREENING  Never done     AORTIC ANEURYSM SCREENING (SYSTEM ASSIGNED)  Never done     MEDICARE ANNUAL WELLNESS VISIT  11/09/2022     ANNUAL REVIEW OF HM ORDERS  11/09/2022     FALL RISK ASSESSMENT  01/02/2024     LIPID  11/09/2026     DTAP/TDAP/TD IMMUNIZATION (4 - Td or Tdap) 07/10/2027     COLORECTAL CANCER SCREENING  10/26/2027     ADVANCE CARE PLANNING  01/02/2028     HEPATITIS C SCREENING  Completed     HIV SCREENING  Completed     PHQ-2 (once per calendar year)  Completed     INFLUENZA VACCINE  Completed     Pneumococcal Vaccine: 65+ Years  Completed     ZOSTER IMMUNIZATION  Completed     COVID-19 Vaccine  Completed     IPV IMMUNIZATION  Aged Out     MENINGITIS IMMUNIZATION  Aged Out     Patient Active Problem List   Diagnosis     Chronic atrial fibrillation (H)     Sarcoidosis     Primary osteoarthritis of both knees     Past Surgical History:   Procedure Laterality Date     BACK SURGERY  2008    fusion C5-6-7     EYE SURGERY Bilateral 02/2018     ZZC NONSPECIFIC PROCEDURE      pyloric stenosis repair     ZZC NONSPECIFIC PROCEDURE      right inguinal hernia     ZZC NONSPECIFIC PROCEDURE      bladder  surgery     ZZC NONSPECIFIC PROCEDURE      emergency appendectomy       Social History     Tobacco Use     Smoking status: Former     Types: Cigars     Smokeless tobacco: Never     Tobacco comments:     in past has smoked cigarettes perhaps a pack a month   Substance Use Topics     Alcohol use: No     Comment: not for 20 years     Family History   Problem Relation Age of Onset     Cancer Mother         colon cancer 73     Neurologic Disorder Father         dad had ALS,  at 60     Cardiovascular Maternal Grandfather      Arthritis Paternal Grandmother      Cancer Paternal Grandfather         lung cancer     Gastrointestinal Disease Sister      Cardiovascular Sister         whole in heart         Current Outpatient Medications   Medication Sig Dispense Refill     acetaminophen (TYLENOL) 500 MG tablet Take 500-1,000 mg by mouth every 6 hours as needed for mild pain       Ascorbic Acid (VITAMIN C) 100 MG CHEW        vitamin D3 (CHOLECALCIFEROL) 50 mcg (2000 units) tablet        Naproxen Sodium (ALEVE PO) Take 220 mg by mouth daily as needed        Allergies   Allergen Reactions     Bee Venom Swelling     Does not carry Epi Pen      Contrast Dye Rash       Review of Systems   Constitutional: Negative for chills and fever.   HENT: Negative for congestion, ear pain, hearing loss and sore throat.    Eyes: Negative for pain and visual disturbance.   Respiratory: Negative for cough and shortness of breath.    Cardiovascular: Negative for chest pain, palpitations and peripheral edema.   Gastrointestinal: Negative for abdominal pain, constipation, diarrhea, heartburn, hematochezia and nausea.   Genitourinary: Negative for dysuria, frequency, genital sores, hematuria, impotence, penile discharge and urgency.   Musculoskeletal: Positive for arthralgias and myalgias. Negative for joint swelling.   Skin: Negative for rash.   Neurological: Negative for dizziness, weakness, headaches and paresthesias.   Psychiatric/Behavioral:  "Negative for mood changes. The patient is not nervous/anxious.        OBJECTIVE:   /75 (BP Location: Left arm, Patient Position: Sitting, Cuff Size: Adult Large)   Pulse 74   Temp 97.7  F (36.5  C) (Temporal)   Resp 18   Ht 1.867 m (6' 1.5\")   Wt 98.9 kg (218 lb 1.6 oz)   SpO2 98%   BMI 28.38 kg/m   Estimated body mass index is 28.38 kg/m  as calculated from the following:    Height as of this encounter: 1.867 m (6' 1.5\").    Weight as of this encounter: 98.9 kg (218 lb 1.6 oz).  Physical Exam  GENERAL: healthy, alert and no distress  EYES: Eyes grossly normal to inspection, PERRL and conjunctivae and sclerae normal  HENT: ear canals and TM's normal, nose and mouth without ulcers or lesions  NECK: no adenopathy, no asymmetry, masses, or scars and thyroid normal to palpation  RESP: lungs clear to auscultation - no rales, rhonchi or wheezes  CV: regular rate and rhythm, normal S1 S2, no S3 or S4, no murmur, click or rub, no peripheral edema and peripheral pulses strong  ABDOMEN: soft, nontender, no hepatosplenomegaly, no masses and bowel sounds normal  RECTAL: he declined an exam this afternoon   MS: no gross musculoskeletal defects noted, no edema  SKIN: no suspicious lesions or rashes  NEURO: Normal strength and tone, mentation intact and speech normal  PSYCH: mentation appears normal, affect normal/bright    Labs pending     ASSESSMENT / PLAN:       ICD-10-CM    1. Routine general medical examination at a health care facility  Z00.00 Lipid panel reflex to direct LDL Fasting     Comprehensive metabolic panel     CBC with platelets      2. Screening for AAA (aortic abdominal aneurysm)  Z13.6  Aorta Medicare AAA Screening      3. Paroxysmal atrial fibrillation (H)  I48.0       4. Prostate cancer screening  Z12.5 PROSTATE SPEC ANTIGEN SCREEN        Regular rhythm on exam today  Check screening labs    Patient has been advised of split billing requirements and indicates understanding: " Yes      COUNSELING:  Reviewed preventive health counseling, as reflected in patient instructions  Special attention given to:       Consider AAA screening for ages 65-75 and smoking history; referred for exam       Regular exercise       Healthy diet/nutrition       Immunizations    Vaccines are up to dage              Hepatitis C screening       HIV screening for high risk patient       Consider lung cancer screening for ages 55-80 years (77 for Medicare) and 20 pack-year smoking history; he quit age 32's       Colon cancer screening; 7 polyps noted 10/2022; 3 year follow up recommended        Prostate cancer screening; PSA         He reports that he has quit smoking. His smoking use included cigars. He has never used smokeless tobacco.      Appropriate preventive services were discussed with this patient, including applicable screening as appropriate for cardiovascular disease, diabetes, osteopenia/osteoporosis, and glaucoma.  As appropriate for age/gender, discussed screening for colorectal cancer, prostate cancer, breast cancer, and cervical cancer. Checklist reviewing preventive services available has been given to the patient.    Reviewed patients plan of care and provided an AVS. The Basic Care Plan (routine screening as documented in Health Maintenance) for Hayden meets the Care Plan requirement. This Care Plan has been established and reviewed with the Patient.      Grayson Mae MD  [facility]    Identified Health Risks:

## 2023-01-02 NOTE — PATIENT INSTRUCTIONS
Please call [facility] at [phone] to schedule your abdominal aortic aneurysm screening ultrasound.

## 2023-01-03 ENCOUNTER — HOSPITAL ENCOUNTER (OUTPATIENT)
Dept: ULTRASOUND IMAGING | Facility: CLINIC | Age: 68
Discharge: HOME | End: 2023-01-03
Attending: INTERNAL MEDICINE | Admitting: INTERNAL MEDICINE
Payer: MEDICARE

## 2023-01-03 DIAGNOSIS — Z13.6 SCREENING FOR AAA (AORTIC ABDOMINAL ANEURYSM): ICD-10-CM

## 2023-01-03 PROCEDURE — 76706 US ABDL AORTA SCREEN AAA: CPT

## 2023-01-03 NOTE — RESULT ENCOUNTER NOTE
"The following letter pertains to your most recent diagnostic tests:    -The PSA is mildly elevated.  I think we should recheck this when you return for your vacation because a temporary elevation due to inflammation in the prostate gland is not uncommon and we would only need to be concerned about this elevation if it is persistent.  I recommend a recheck in 4-8 weeks.  You can schedule a lab appointment for that purpose.      -Your total cholesterol is 241 which is above your goal of total cholesterol less than 200.    -Your triglycerides are 119 which are at your goal of triglycerides less than 150.    -Your HDL or \"good cholesterol\" is 47 which is at your goal of HDL cholesterol greater than 40.    -Your LDL cholesterol or \"bad cholesterol\" is 170 which is above your goal of LDL cholesterol less than <100.  Your LDL goal is based on your risk factors for artery disease.    -Liver and gallbladder tests are normal for you. (ALT,AST, Alk phos, bilirubin), kidney function is normal for you (Creatinine, GFR), Sodium is normal, Potassium is normal for you, Calcium is normal for you, Glucose (blood sugar) is normal for you.      -Your complete blood counts including your hemoglobin returned normal for you.         Bottom line:  The cholesterol has worsened since last check.  Based on your cholesterol levels and other risk factors, your calculated statistical risk for having a heart attack over the next 10 years is elevated.  I estimate a 11.8% chance of heart attack over the next 10 years.      Because we believe that statin medications such as atorvastatin (Lipitor) have protective effects in the blood vessels that extend beyond their ability to lower cholesterol, if you took atorvastatin (Lipitor) regularly, you could bring that 10-year statistical risk for heart attack down significantly.  Generally, we recommend statin cholesterol-lowering medications such as atorvastatin (Lipitor) to individuals with 10 year " statistical risk for heart attack greater than 7.5%.  As such, I would recommend you starting that drug for prevention purposes.      You should be informed that a very small minority of people who take atorvastatin can develop muscle pain and weakness as a side effect from that drug.  If you experience those side effects, then stop the drug and contact me.      If you start the medication, you should have a follow up fasting cholesterol panel after you have been taking the medication for 1-2 months.  This could be combined with your PSA recheck.  You can schedule a lab appointment for that purpose.      We should recheck the PSA in 1-2 months as stated above.      We can discuss these matters in more detail when I see you later in the week.        Sincerely,    Dr. Mae    The 10-year ASCVD risk score (Marjorie OAKES, et al., 2019) is: 11.8%    Values used to calculate the score:      Age: 67 years      Sex: Male      Is Non- : No      Diabetic: No      Tobacco smoker: No      Systolic Blood Pressure: 111 mmHg      Is BP treated: No      HDL Cholesterol: 47 mg/dL      Total Cholesterol: 241 mg/dL

## 2023-01-03 NOTE — RESULT ENCOUNTER NOTE
The following letter pertains to your most recent diagnostic tests:    Good news! -Your abdominal aortic aneurysm screen ultrasound did NOT show evidence for abdominal aortic aneurysm.       Sincerely,    Dr. Mae

## 2023-01-05 ENCOUNTER — OFFICE VISIT (OUTPATIENT)
Dept: FAMILY MEDICINE | Facility: CLINIC | Age: 68
End: 2023-01-05
Payer: MEDICARE

## 2023-01-05 ENCOUNTER — MYC MEDICAL ADVICE (OUTPATIENT)
Dept: FAMILY MEDICINE | Facility: CLINIC | Age: 68
End: 2023-01-05

## 2023-01-05 VITALS
DIASTOLIC BLOOD PRESSURE: 76 MMHG | RESPIRATION RATE: 18 BRPM | BODY MASS INDEX: 27.76 KG/M2 | HEIGHT: 74 IN | WEIGHT: 216.3 LBS | SYSTOLIC BLOOD PRESSURE: 116 MMHG | OXYGEN SATURATION: 98 % | TEMPERATURE: 97.3 F | HEART RATE: 56 BPM

## 2023-01-05 DIAGNOSIS — I48.0 PAROXYSMAL ATRIAL FIBRILLATION (H): ICD-10-CM

## 2023-01-05 DIAGNOSIS — I49.3 FREQUENT PVCS: Primary | ICD-10-CM

## 2023-01-05 DIAGNOSIS — R97.20 ELEVATED PROSTATE SPECIFIC ANTIGEN (PSA): ICD-10-CM

## 2023-01-05 DIAGNOSIS — M17.0 PRIMARY OSTEOARTHRITIS OF BOTH KNEES: ICD-10-CM

## 2023-01-05 DIAGNOSIS — E78.5 HYPERLIPIDEMIA LDL GOAL <100: ICD-10-CM

## 2023-01-05 DIAGNOSIS — Z01.818 PREOP GENERAL PHYSICAL EXAM: Primary | ICD-10-CM

## 2023-01-05 PROCEDURE — 99214 OFFICE O/P EST MOD 30 MIN: CPT | Performed by: INTERNAL MEDICINE

## 2023-01-05 PROCEDURE — 93005 ELECTROCARDIOGRAM TRACING: CPT | Performed by: INTERNAL MEDICINE

## 2023-01-05 RX ORDER — ATORVASTATIN CALCIUM 40 MG/1
40 TABLET, FILM COATED ORAL DAILY
Qty: 90 TABLET | Refills: 3 | Status: SHIPPED | OUTPATIENT
Start: 2023-01-05

## 2023-01-05 ASSESSMENT — PAIN SCALES - GENERAL: PAINLEVEL: NO PAIN (0)

## 2023-01-05 NOTE — PROGRESS NOTES
[address], [address]  [facility] [address]  Phone: [phone]  Primary Provider: Silvano Akbar  Pre-op Performing Provider: SILVANO AKBAR      PREOPERATIVE EVALUATION:  Today's date: 1/5/2023    Hayden Chowdary is a 67 year old male who presents for a preoperative evaluation.    Surgical Information:  Surgery/Procedure: BILATERAL TOTAL KNEE JOINT REPLACEMENT  Surgery Location: [facility] Operating Room  Surgeon: Bernardo Reynolds MD  Surgery Date: 2/3/2023  Time of Surgery: 4:45 pm   Where patient plans to recover: At home with family  Fax number for surgical facility: [phone]    Type of Anesthesia Anticipated: General    Assessment & Plan     The proposed surgical procedure is considered INTERMEDIATE risk.    Preop general physical exam  Suitable candidate for planned surgery if EKG stable   - EKG 12-lead complete w/read - Clinics    Primary osteoarthritis of both knees      Elevated prostate specific antigen (PSA)  Recheck PSA at two month interval along with lipid panel upon starting statin   - PSA tumor marker; Future    Paroxysmal atrial fibrillation (H)  Regular rhythm on exam today; remote history of successful ablation without recurrence     Hyperlipidemia LDL goal <100  discussed rationale for statin therapy for primary prevention in detail  Thorough discussion about potential risks and side effects   Start atorvastatin (Lipitor) 40 daily and recheck lipids when he returns for follow up PSA in 2 months              Risks and Recommendations:  The patient has the following additional risks and recommendations for perioperative complications:   - Consult Hospitalist / IM to assist with post-op medical management    Medication Instructions:  Patient is to take all scheduled medications on the day of surgery EXCEPT for modifications listed below:   - Statins: Continue taking on the day of surgery.     RECOMMENDATION:  APPROVAL GIVEN to proceed with proposed  procedure, without further diagnostic evaluation.      30 minutes spent on the date of the encounter doing chart review, history and exam, documentation and further activities per the note      Subjective     HPI related to upcoming procedure:  Scheduled for bilateral knee replacement in Feb.  This patient reports being able to perform 4 METS of physical activity without chest pain or dyspnea.     Preop Questions 1/3/2023   1. Have you ever had a heart attack or stroke? No   2. Have you ever had surgery on your heart or blood vessels, such as a stent placement, a coronary artery bypass, or surgery on an artery in your head, neck, heart, or legs? No   3. Do you have chest pain with activity? No   4. Do you have a history of  heart failure? No   5. Do you currently have a cold, bronchitis or symptoms of other infection? No   6. Do you have a cough, shortness of breath, or wheezing? No   7. Do you or anyone in your family have previous history of blood clots? No   8. Do you or does anyone in your family have a serious bleeding problem such as prolonged bleeding following surgeries or cuts? No   9. Have you ever had problems with anemia or been told to take iron pills? No   10. Have you had any abnormal blood loss such as black, tarry or bloody stools? No   11. Have you ever had a blood transfusion? No   12. Are you willing to have a blood transfusion if it is medically needed before, during, or after your surgery? Yes   13. Have you or any of your relatives ever had problems with anesthesia? No   14. Do you have sleep apnea, excessive snoring or daytime drowsiness? No   15. Do you have any artifical heart valves or other implanted medical devices like a pacemaker, defibrillator, or continuous glucose monitor? No   16. Do you have artificial joints? No   17. Are you allergic to latex? No       Health Care Directive:  Patient has a Health Care Directive on file      Preoperative Review of :      Review of  Systems  Constitutional, neuro, ENT, endocrine, pulmonary, cardiac, gastrointestinal, genitourinary, musculoskeletal, integument and psychiatric systems are negative, except as otherwise noted.    Patient Active Problem List    Diagnosis Date Noted     Sarcoidosis 07/10/2017     Priority: Medium     Primary osteoarthritis of both knees 07/10/2017     Priority: Medium     Chronic atrial fibrillation (H) 06/12/2014     Priority: Medium     Status post atrial fibrillation ablation         Past Medical History:   Diagnosis Date     Atrial fibrillation (H)     s/p catheter ablation 1/2014     Cancer (H)     basal cell  removed from back x 2     Primary osteoarthritis of both knees 7/10/2017     Sarcoidosis 7/10/2017     Past Surgical History:   Procedure Laterality Date     BACK SURGERY  2008    fusion C5-6-7     EYE SURGERY Bilateral 02/2018     ZZ NONSPECIFIC PROCEDURE      pyloric stenosis repair     ZC NONSPECIFIC PROCEDURE      right inguinal hernia     Z NONSPECIFIC PROCEDURE      bladder surgery     Z NONSPECIFIC PROCEDURE      emergency appendectomy     Current Outpatient Medications   Medication Sig Dispense Refill     acetaminophen (TYLENOL) 500 MG tablet Take 500-1,000 mg by mouth every 6 hours as needed for mild pain       Ascorbic Acid (VITAMIN C) 100 MG CHEW        atorvastatin (LIPITOR) 40 MG tablet Take 1 tablet (40 mg) by mouth daily 90 tablet 3     vitamin D3 (CHOLECALCIFEROL) 50 mcg (2000 units) tablet          Allergies   Allergen Reactions     Iodine Rash     Bee Venom Swelling     Does not carry Epi Pen      Contrast Dye Rash        Social History     Tobacco Use     Smoking status: Former     Types: Cigars     Smokeless tobacco: Never     Tobacco comments:     in past has smoked cigarettes perhaps a pack a month   Substance Use Topics     Alcohol use: No     Comment: not for 20 years     Family History   Problem Relation Age of Onset     Cancer Mother         colon cancer 73     Neurologic  "Disorder Father         dad had ALS,  at 60     Cardiovascular Maternal Grandfather      Arthritis Paternal Grandmother      Cancer Paternal Grandfather         lung cancer     Gastrointestinal Disease Sister      Cardiovascular Sister         whole in heart     History   Drug Use No         Objective     /76 (BP Location: Right arm, Patient Position: Sitting, Cuff Size: Adult Large)   Pulse 56   Temp 97.3  F (36.3  C) (Oral)   Resp 18   Ht 1.873 m (6' 1.75\")   Wt 98.1 kg (216 lb 4.8 oz)   SpO2 98%   BMI 27.96 kg/m      Physical Exam    GENERAL APPEARANCE: healthy, alert and no distress     EYES: EOMI,  PERRL     HENT: ear canals and TM's normal and nose and mouth without ulcers or lesions     NECK: no adenopathy, no asymmetry, masses, or scars and thyroid normal to palpation     RESP: lungs clear to auscultation - no rales, rhonchi or wheezes     CV: regular rates and rhythm, normal S1 S2, no S3 or S4 and no murmur, click or rub     ABDOMEN:  soft, nontender, no HSM or masses and bowel sounds normal     MS: extremities normal- no gross deformities noted, no evidence of inflammation in joints, FROM in all extremities.     SKIN: no suspicious lesions or rashes     NEURO: Normal strength and tone, sensory exam grossly normal, mentation intact and speech normal     PSYCH: mentation appears normal. and affect normal/bright     LYMPHATICS: No cervical adenopathy    Recent Labs   Lab Test 23  1533 21  0924   HGB 13.6  --    *  --     138   POTASSIUM 4.0 4.5   CR 1.10 1.03        Diagnostics:     EKG pending   Labs from last visit OK for surgery     Revised Cardiac Risk Index (RCRI):  The patient has the following serious cardiovascular risks for perioperative complications:   - No serious cardiac risks = 0 points     RCRI Interpretation: 0 points: Class I (very low risk - 0.4% complication rate)           Signed Electronically by: Grayson Mae MD  Copy of this evaluation " report is provided to requesting physician.

## 2023-01-06 NOTE — RESULT ENCOUNTER NOTE
As we discussed by phone, I would like you to call [phone] to schedule your echocardiogram (ultrasound of the heart) to follow up on the premature ventricular contractions (PVCs) noted on your preoperative EKG.

## 2023-01-09 NOTE — TELEPHONE ENCOUNTER
To PCP,  See below.    Please advise. Echo pended for your Review.     Emilie Mcgee RN on 1/9/2023 at 1:48 PM

## 2023-01-10 NOTE — TELEPHONE ENCOUNTER
Patient Contact    Attempt # 1    Was call answered?  No.  Left detailed message on voicemail with provider's message below.    Liberty Monroe, RN  [facility]

## 2023-01-10 NOTE — TELEPHONE ENCOUNTER
Patient called to state he called to schedule his ECHO but was told that there was not order on file.     Order is pended. Please sign if appropriate.     Patient requesting call back to [phone] once order is signed. Okay to leave detailed message.   Patient reports he will have his ECHO completed in [place] and will call [phone] to schedule once order is signed.       Latha Mendoza RN BSN MSN  [facility]

## 2023-01-30 ENCOUNTER — HOSPITAL ENCOUNTER (OUTPATIENT)
Dept: CARDIOLOGY | Facility: CLINIC | Age: 68
Discharge: HOME | End: 2023-01-30
Attending: INTERNAL MEDICINE | Admitting: INTERNAL MEDICINE
Payer: MEDICARE

## 2023-01-30 DIAGNOSIS — I49.3 FREQUENT PVCS: ICD-10-CM

## 2023-01-30 LAB — LVEF ECHO: NORMAL

## 2023-01-30 PROCEDURE — 93306 TTE W/DOPPLER COMPLETE: CPT | Mod: 26 | Performed by: INTERNAL MEDICINE

## 2023-01-30 PROCEDURE — 93306 TTE W/DOPPLER COMPLETE: CPT

## 2023-01-30 NOTE — RESULT ENCOUNTER NOTE
The following letter pertains to your most recent diagnostic tests:    Good news! The Echocardiogram looks OK for proceeding with surgery as planned.  No dangerous cause for the premature ventricular contractions (PVCs) could be identified by this study.        Sincerely,    Dr. Mae

## 2023-02-27 ENCOUNTER — OFFICE VISIT (OUTPATIENT)
Dept: FAMILY MEDICINE | Facility: CLINIC | Age: 68
End: 2023-02-27
Payer: MEDICARE

## 2023-02-27 VITALS
OXYGEN SATURATION: 98 % | SYSTOLIC BLOOD PRESSURE: 96 MMHG | TEMPERATURE: 97.9 F | BODY MASS INDEX: 26.56 KG/M2 | HEART RATE: 94 BPM | HEIGHT: 74 IN | DIASTOLIC BLOOD PRESSURE: 76 MMHG | RESPIRATION RATE: 18 BRPM | WEIGHT: 207 LBS

## 2023-02-27 DIAGNOSIS — E78.5 HYPERLIPIDEMIA LDL GOAL <100: ICD-10-CM

## 2023-02-27 DIAGNOSIS — Z96.653 STATUS POST TOTAL KNEE REPLACEMENT, BILATERAL: ICD-10-CM

## 2023-02-27 DIAGNOSIS — R07.89 CHEST PRESSURE: Primary | ICD-10-CM

## 2023-02-27 DIAGNOSIS — I48.0 PAROXYSMAL ATRIAL FIBRILLATION (H): ICD-10-CM

## 2023-02-27 DIAGNOSIS — R97.20 ELEVATED PROSTATE SPECIFIC ANTIGEN (PSA): ICD-10-CM

## 2023-02-27 DIAGNOSIS — R53.83 FATIGUE, UNSPECIFIED TYPE: ICD-10-CM

## 2023-02-27 PROBLEM — M17.0 PRIMARY OSTEOARTHRITIS OF BOTH KNEES: Status: ACTIVE | Noted: 2017-07-10

## 2023-02-27 LAB
BASOPHILS # BLD AUTO: 0.1 10E3/UL (ref 0–0.2)
BASOPHILS NFR BLD AUTO: 1 %
EOSINOPHIL # BLD AUTO: 0.4 10E3/UL (ref 0–0.7)
EOSINOPHIL NFR BLD AUTO: 5 %
ERYTHROCYTE [DISTWIDTH] IN BLOOD BY AUTOMATED COUNT: 14.6 % (ref 10–15)
HCT VFR BLD AUTO: 37.6 % (ref 40–53)
HGB BLD-MCNC: 11.7 G/DL (ref 13.3–17.7)
LYMPHOCYTES # BLD AUTO: 1.7 10E3/UL (ref 0.8–5.3)
LYMPHOCYTES NFR BLD AUTO: 25 %
MCH RBC QN AUTO: 30.2 PG (ref 26.5–33)
MCHC RBC AUTO-ENTMCNC: 31.1 G/DL (ref 31.5–36.5)
MCV RBC AUTO: 97 FL (ref 78–100)
MONOCYTES # BLD AUTO: 0.7 10E3/UL (ref 0–1.3)
MONOCYTES NFR BLD AUTO: 11 %
NEUTROPHILS # BLD AUTO: 3.9 10E3/UL (ref 1.6–8.3)
NEUTROPHILS NFR BLD AUTO: 58 %
PLATELET # BLD AUTO: 328 10E3/UL (ref 150–450)
RBC # BLD AUTO: 3.87 10E6/UL (ref 4.4–5.9)
WBC # BLD AUTO: 6.7 10E3/UL (ref 4–11)

## 2023-02-27 PROCEDURE — 93000 ELECTROCARDIOGRAM COMPLETE: CPT

## 2023-02-27 PROCEDURE — 84153 ASSAY OF PSA TOTAL: CPT

## 2023-02-27 PROCEDURE — 99214 OFFICE O/P EST MOD 30 MIN: CPT

## 2023-02-27 PROCEDURE — 80061 LIPID PANEL: CPT

## 2023-02-27 PROCEDURE — 85025 COMPLETE CBC W/AUTO DIFF WBC: CPT

## 2023-02-27 PROCEDURE — 36415 COLL VENOUS BLD VENIPUNCTURE: CPT

## 2023-02-27 PROCEDURE — 80053 COMPREHEN METABOLIC PANEL: CPT

## 2023-02-27 RX ORDER — CELECOXIB 200 MG/1
CAPSULE ORAL
COMMUNITY
Start: 2023-02-06 | End: 2023-02-27

## 2023-02-27 RX ORDER — OXYCODONE HYDROCHLORIDE 5 MG/1
5-10 TABLET ORAL
COMMUNITY
Start: 2023-02-06 | End: 2023-02-27

## 2023-02-27 RX ORDER — SENNOSIDES A AND B 8.6 MG/1
2 TABLET, FILM COATED ORAL
COMMUNITY
Start: 2023-02-06 | End: 2023-02-27

## 2023-02-27 RX ORDER — IMIQUIMOD 12.5 MG/.25G
CREAM TOPICAL
COMMUNITY
Start: 2023-02-13

## 2023-02-27 NOTE — Clinical Note
Dr. Mae, Just wanted to FYI your patient today. Saw him today and had c/o chest pressure with throat tightness, with episodes of fainting in the past couple weeks. Sending for stress echo to rule in/out cardiac concerns. Does have raspy voice however and patient is concerned could be from Atorvastatin and was wondering if it could be cut back or med switched, figured I would run this by you since you recently started him on this and didn't want to murky the bhatia too much.   EDITH Stinson CNP

## 2023-02-28 DIAGNOSIS — R97.20 ELEVATED PROSTATE SPECIFIC ANTIGEN (PSA): Primary | ICD-10-CM

## 2023-02-28 LAB
ALBUMIN SERPL BCG-MCNC: 4.3 G/DL (ref 3.5–5.2)
ALP SERPL-CCNC: 114 U/L (ref 40–129)
ALT SERPL W P-5'-P-CCNC: 37 U/L (ref 10–50)
ANION GAP SERPL CALCULATED.3IONS-SCNC: 12 MMOL/L (ref 7–15)
AST SERPL W P-5'-P-CCNC: 31 U/L (ref 10–50)
BILIRUB SERPL-MCNC: 0.7 MG/DL
BUN SERPL-MCNC: 20.2 MG/DL (ref 8–23)
CALCIUM SERPL-MCNC: 9.6 MG/DL (ref 8.8–10.2)
CHLORIDE SERPL-SCNC: 101 MMOL/L (ref 98–107)
CHOLEST SERPL-MCNC: 122 MG/DL
CREAT SERPL-MCNC: 1.02 MG/DL (ref 0.67–1.17)
DEPRECATED HCO3 PLAS-SCNC: 24 MMOL/L (ref 22–29)
GFR SERPL CREATININE-BSD FRML MDRD: 82 ML/MIN/1.73M2
GLUCOSE SERPL-MCNC: 97 MG/DL (ref 70–99)
HDLC SERPL-MCNC: 39 MG/DL
LDLC SERPL CALC-MCNC: 57 MG/DL
NONHDLC SERPL-MCNC: 83 MG/DL
POTASSIUM SERPL-SCNC: 4.5 MMOL/L (ref 3.4–5.3)
PROT SERPL-MCNC: 7.4 G/DL (ref 6.4–8.3)
PSA SERPL-MCNC: 4.86 NG/ML (ref 0–4.5)
SODIUM SERPL-SCNC: 137 MMOL/L (ref 136–145)
TRIGL SERPL-MCNC: 128 MG/DL

## 2023-02-28 NOTE — RESULT ENCOUNTER NOTE
The following letter pertains to your most recent diagnostic tests:    -Your cholesterol panel looks much better  I recommend sticking with the atorvastatin (Lipitor) as you are.    -Unfortunately, our PSA (prostate cancer screening test) is slightly higher than last check.      Bottom line: The PSA remains elevated.  I would recommend a consultation with the prostate specialist (urologist) to further investigate this matter.    I recommend Dr. Emmanuel Andersen or Dr. Donal Perry     Please call [facility] [phone] to schedule an appointment.        Sincerely,    Dr. Mae

## 2023-03-08 ENCOUNTER — OFFICE VISIT (OUTPATIENT)
Dept: UROLOGY | Facility: CLINIC | Age: 68
End: 2023-03-08
Payer: MEDICARE

## 2023-03-08 VITALS
SYSTOLIC BLOOD PRESSURE: 120 MMHG | BODY MASS INDEX: 26.69 KG/M2 | HEIGHT: 74 IN | DIASTOLIC BLOOD PRESSURE: 74 MMHG | HEART RATE: 81 BPM | WEIGHT: 208 LBS

## 2023-03-08 DIAGNOSIS — R97.20 ELEVATED PROSTATE SPECIFIC ANTIGEN (PSA): Primary | ICD-10-CM

## 2023-03-08 LAB
ALBUMIN UR-MCNC: NEGATIVE MG/DL
APPEARANCE UR: CLEAR
BILIRUB UR QL STRIP: NEGATIVE
COLOR UR AUTO: YELLOW
GLUCOSE UR STRIP-MCNC: NEGATIVE MG/DL
HGB UR QL STRIP: NEGATIVE
KETONES UR STRIP-MCNC: NEGATIVE MG/DL
LEUKOCYTE ESTERASE UR QL STRIP: NEGATIVE
NITRATE UR QL: NEGATIVE
PH UR STRIP: 6.5 [PH] (ref 5–7)
RESIDUAL VOLUME (RV) (EXTERNAL): 27
SP GR UR STRIP: 1.01 (ref 1–1.03)
UROBILINOGEN UR STRIP-ACNC: 0.2 E.U./DL

## 2023-03-08 PROCEDURE — 99204 OFFICE O/P NEW MOD 45 MIN: CPT | Mod: 25 | Performed by: STUDENT IN AN ORGANIZED HEALTH CARE EDUCATION/TRAINING PROGRAM

## 2023-03-08 PROCEDURE — 51798 US URINE CAPACITY MEASURE: CPT | Performed by: STUDENT IN AN ORGANIZED HEALTH CARE EDUCATION/TRAINING PROGRAM

## 2023-03-08 PROCEDURE — 81003 URINALYSIS AUTO W/O SCOPE: CPT | Mod: QW | Performed by: STUDENT IN AN ORGANIZED HEALTH CARE EDUCATION/TRAINING PROGRAM

## 2023-03-08 ASSESSMENT — PAIN SCALES - GENERAL: PAINLEVEL: MILD PAIN (2)

## 2023-03-08 NOTE — LETTER
3/8/2023       RE: Hayden Chowdary  [address] [address]     Dear Colleague,    Thank you for referring your patient, Hayden Chowdary, to the [facility] [place] at [facility]. Please see a copy of my visit note below.          Chief Complaint:    Elevated PSA (Prostate Specific Antigen)           Consult or Referral:     Mr. Hayden Chowdary is a 67 year old male seen at the request of Dr. Mae.         History of Present Illness:     Hayden Chowdary is a 67 year old male being seen for Elevated PSA  .  Duration of problem: 2 months  Previous treatments: Has had bilateral knee replacement recently      Reviewed previous notes from Dr. Mae    Bill presents today for history of elevated PSA  He has a brother who had prostate cancer about 10 years ago in his late 50s  Not sure what he had for treatment but thinks that he had the prostate removed  No other members of the family have prostate cancer  He does not have any significant LUTS             Past Medical History:     Past Medical History:   Diagnosis Date     Atrial fibrillation (H)     s/p catheter ablation 1/2014     Cancer (H)     basal cell  removed from back x 2     Primary osteoarthritis of both knees 7/10/2017     Sarcoidosis 7/10/2017            Past Surgical History:     Past Surgical History:   Procedure Laterality Date     BACK SURGERY  2008    fusion C5-6-7     EYE SURGERY Bilateral 02/2018     ZZC NONSPECIFIC PROCEDURE      pyloric stenosis repair     ZZC NONSPECIFIC PROCEDURE      right inguinal hernia     ZZC NONSPECIFIC PROCEDURE      bladder surgery     ZZC NONSPECIFIC PROCEDURE      emergency appendectomy            Medications     Current Outpatient Medications   Medication     acetaminophen (TYLENOL) 500 MG tablet     aspirin (ASA) 81 MG EC tablet     atorvastatin (LIPITOR) 40 MG tablet     calcium carbonate 600 mg-vitamin D 400  units 600-400 MG-UNIT per tablet     imiquimod (ALDARA) 5 % external cream     VITAMIN D PO     No current facility-administered medications for this visit.            Family History:     Family History   Problem Relation Age of Onset     Cancer Mother         colon cancer 73     Neurologic Disorder Father         dad had ALS,  at 60     Cardiovascular Maternal Grandfather      Arthritis Paternal Grandmother      Cancer Paternal Grandfather         lung cancer     Gastrointestinal Disease Sister      Cardiovascular Sister         whole in heart            Social History:     Social History     Socioeconomic History     Marital status:      Spouse name: Not on file     Number of children: Not on file     Years of education: Not on file     Highest education level: Not on file   Occupational History     Not on file   Tobacco Use     Smoking status: Former     Types: Cigars     Smokeless tobacco: Never     Tobacco comments:     in past has smoked cigarettes perhaps a pack a month   Vaping Use     Vaping Use: Never used   Substance and Sexual Activity     Alcohol use: No     Comment: not for 20 years     Drug use: No     Sexual activity: Yes     Partners: Female   Other Topics Concern     Parent/sibling w/ CABG, MI or angioplasty before 65F 55M? Not Asked   Social History Narrative     Not on file     Social Determinants of Health     Financial Resource Strain: Low Risk      Difficulty of Paying Living Expenses: Not hard at all   Food Insecurity: No Food Insecurity     Worried About Running Out of Food in the Last Year: Never true     Ran Out of Food in the Last Year: Never true   Transportation Needs: No Transportation Needs     Lack of Transportation (Medical): No     Lack of Transportation (Non-Medical): No   Physical Activity: Not on file   Stress: Not on file   Social Connections: Not on file   Intimate Partner Violence: Not on file   Housing Stability: Not on file            Allergies:   Iodine, Bee  "venom, Contrast dye, and Diagnostic x-ray materials         Review of Systems:  From intake questionnaire     Skin: negative  Eyes: negative  Ears/Nose/Throat: negative  Respiratory: No shortness of breath, dyspnea on exertion, cough, or hemoptysis  Cardiovascular: No chest pain or palpitations  Gastrointestinal: negative; no nausea/vomiting, constipation or diarrhea  Genitourinary: as per HPI  Musculoskeletal: negative  Neurologic: negative  Psychiatric: negative  Hematologic/Lymphatic/Immunologic: negative  Endocrine: negative         Physical Exam:     Patient is a 67 year old  male   Vitals: Blood pressure 120/74, pulse 81, height 1.873 m (6' 1.75\"), weight 94.3 kg (208 lb).  Constitutional: Body mass index is 26.89 kg/m .  Alert, no acute distress, oriented, conversant  Eyes: no scleral icterus; extraocular muscles intact, moist conjunctivae  Neck: trachea midline, no thyromegaly  Ears/nose/mouth: throat/mouth:normal, good dentition  Respiratory: no respiratory distress, or pursed lip breathing  Cardiovascular: pulses strong and intact; no obvious jugular venous distension present  Gastrointestinal: soft, nontender, no organomegaly or masses,   Lymphatics: No inguinal adenopathy  Musculoskeletal: extremities normal, no peripheral edema  Skin: no suspicious lesions or rashes  Neuro: Alert, oriented, speech and mentation normal  Psych: affect and mood normal, alert and oriented to person, place and time  Gait: Normal  : PREET anodular, symmetric      Labs and Pathology:    The following labs were reviewed by me and discussed with the patient:  UA: Normal  Significant for   Lab Results   Component Value Date    CR 1.02 02/27/2023    CR 1.10 01/02/2023    CR 1.03 11/09/2021    CR 0.96 10/16/2020    CR 1.01 09/19/2019    CR 1.00 07/10/2017    CR 1.28 01/31/2014    CR 1.04 01/16/2009     PSA   Date Value Ref Range Status   10/16/2020 2.83 0 - 4 ug/L Final     Comment:     Assay Method:  Chemiluminescence using " Siemens [place]ama analyzer   09/19/2019 3.37 0 - 4 ug/L Final     Comment:     Assay Method:  Chemiluminescence using Siemens Vista analyzer   07/10/2017 2.74 0 - 4 ug/L Final     Comment:     Assay Method:  Chemiluminescence using Siemens Vista analyzer     Prostate Specific Antigen Screen   Date Value Ref Range Status   01/02/2023 4.60 (H) 0.00 - 4.50 ng/mL Final   11/09/2021 3.19 0.00 - 4.00 ug/L Final     PSA Tumor Marker   Date Value Ref Range Status   02/27/2023 4.86 (H) 0.00 - 4.50 ng/mL Final             Imaging:    The following imaging exams were independently viewed and interpreted by me and discussed with patient:         Standardized Questionnaire:      AUASS: 7/35 QOL:1           Assessment and Plan:     Elevated prostate specific antigen (PSA)  Discussed about the significance of an elevated PSA and the need for further evaluation  Specifically since he has a strong family history recommend getting MRI done considering a normal rectal examination today  Discussed about the specifics of MRI and MRI guided prostate biopsy  We will have him scheduled for MRI and I will follow-up with the results on [facility]  If there is any need for biopsy we will have that scheduled.    - Adult Urology  Referral  - UA without Microscopic [[id number]]; Future  - MEASURE POST-VOID RESIDUAL URINE/BLADDER CAPACITY, US NON-IMAGING  - UA without Microscopic [[id number]]  - MR Prostate wo & w Contrast; Future      Plan:  MRI prostate  Update results on MyChart    Orders  Orders Placed This Encounter   Procedures     MEASURE POST-VOID RESIDUAL URINE/BLADDER CAPACITY, US NON-IMAGING     MR Prostate wo & w Contrast     UA without Microscopic [[id number]]       Shaw Fletcher MD  [facility] [place]      ==========================    Additional Billing and Coding Information:  Review of external notes as documented above   Review of the result(s) of each unique test - PSA, creatinine, UA    Independent  interpretation of a test performed by another physician/other qualified health care professional (not separately reported) -       Discussion of management or test interpretation with external physician/other qualified healthcare professional/appropriate source -           15 minutes spent on the date of the encounter doing chart review, review of test results, interpretation of tests, patient visit and documentation

## 2023-03-08 NOTE — NURSING NOTE
Chief Complaint   Patient presents with     Elevated PSA     PVR: 31 mL by bladder scan    Shira Pham, EMT

## 2023-03-14 ENCOUNTER — LAB (OUTPATIENT)
Dept: LAB | Facility: CLINIC | Age: 68
End: 2023-03-14
Payer: MEDICARE

## 2023-03-14 DIAGNOSIS — R53.83 FATIGUE, UNSPECIFIED TYPE: ICD-10-CM

## 2023-03-14 LAB
ERYTHROCYTE [DISTWIDTH] IN BLOOD BY AUTOMATED COUNT: 13 % (ref 10–15)
HCT VFR BLD AUTO: 38.9 % (ref 40–53)
HGB BLD-MCNC: 12.5 G/DL (ref 13.3–17.7)
MCH RBC QN AUTO: 29.9 PG (ref 26.5–33)
MCHC RBC AUTO-ENTMCNC: 32.1 G/DL (ref 31.5–36.5)
MCV RBC AUTO: 93 FL (ref 78–100)
PLATELET # BLD AUTO: 158 10E3/UL (ref 150–450)
RBC # BLD AUTO: 4.18 10E6/UL (ref 4.4–5.9)
WBC # BLD AUTO: 6 10E3/UL (ref 4–11)

## 2023-03-14 PROCEDURE — 85027 COMPLETE CBC AUTOMATED: CPT

## 2023-03-14 PROCEDURE — 36415 COLL VENOUS BLD VENIPUNCTURE: CPT

## 2023-04-26 ENCOUNTER — ANCILLARY PROCEDURE (OUTPATIENT)
Dept: MRI IMAGING | Facility: CLINIC | Age: 68
End: 2023-04-26
Attending: STUDENT IN AN ORGANIZED HEALTH CARE EDUCATION/TRAINING PROGRAM
Payer: MEDICARE

## 2023-04-26 DIAGNOSIS — R97.20 ELEVATED PROSTATE SPECIFIC ANTIGEN (PSA): ICD-10-CM

## 2023-04-26 PROCEDURE — A9585 GADOBUTROL INJECTION: HCPCS | Performed by: RADIOLOGY

## 2023-04-26 PROCEDURE — 72197 MRI PELVIS W/O & W/DYE: CPT | Mod: MG | Performed by: RADIOLOGY

## 2023-04-26 PROCEDURE — G1010 CDSM STANSON: HCPCS | Performed by: RADIOLOGY

## 2023-04-26 RX ORDER — GADOBUTROL 604.72 MG/ML
10 INJECTION INTRAVENOUS ONCE
Status: COMPLETED | OUTPATIENT
Start: 2023-04-26 | End: 2023-04-26

## 2023-04-26 RX ADMIN — GADOBUTROL 9.5 ML: 604.72 INJECTION INTRAVENOUS at 15:34

## 2023-04-26 NOTE — DISCHARGE INSTRUCTIONS
MRI Contrast Discharge Instructions    The IV contrast you received today will pass out of your body in your  urine. This will happen in the next 24 hours. You will not feel this process.  Your urine will not change color.    Drink at least 4 extra glasses of water or juice today (unless your doctor  has restricted your fluids). This reduces the stress on your kidneys.  You may take your regular medicines.    If you are on dialysis: It is best to have dialysis today.    If you have a reaction: Most reactions happen right away. If you have  any new symptoms after leaving the hospital (such as hives or swelling),  call your hospital at the correct number below. Or call your family doctor.  If you have breathing distress or wheezing, call 911.    Special instructions: ***    I have read and understand the above information.    Signature:______________________________________ Date:___________    Staff:__________________________________________ Date:___________     Time:__________    [facility] Departments:    ___[place]: [phone]  ___[facility]: [phone]  ___[place]: [phone] ___[facility]: [phone]  ___[facility]: [phone]  ___[facility]: [phone]  ___[phone]  ___[facility]: [phone]  ___[phone]

## 2023-06-27 ENCOUNTER — LAB (OUTPATIENT)
Dept: LAB | Facility: CLINIC | Age: 68
End: 2023-06-27
Payer: MEDICARE

## 2023-06-27 DIAGNOSIS — E78.5 HYPERLIPIDEMIA LDL GOAL <100: ICD-10-CM

## 2023-06-27 LAB
CHOLEST SERPL-MCNC: 159 MG/DL
HDLC SERPL-MCNC: 41 MG/DL
LDLC SERPL CALC-MCNC: 98 MG/DL
NONHDLC SERPL-MCNC: 118 MG/DL
TRIGL SERPL-MCNC: 99 MG/DL

## 2023-06-27 PROCEDURE — 36415 COLL VENOUS BLD VENIPUNCTURE: CPT

## 2023-06-27 PROCEDURE — 80061 LIPID PANEL: CPT

## 2023-06-28 NOTE — RESULT ENCOUNTER NOTE
The following letter pertains to your most recent diagnostic tests:    This is a copy of your most recent lipid panel.  If you stopped atorvastatin (Lipitor) within the last 2 months of this lab draw some of the effect that you are seeing may be residual from atorvastatin (Lipitor).  Again, I would continue to recommend atorvastatin (Lipitor) 40 mg daily.        Sincerely,    Dr. Mae

## 2023-12-28 ENCOUNTER — IMMUNIZATION (OUTPATIENT)
Dept: FAMILY MEDICINE | Facility: CLINIC | Age: 68
End: 2023-12-28
Payer: MEDICARE

## 2023-12-28 DIAGNOSIS — Z23 HIGH PRIORITY FOR 2019-NCOV VACCINE: Primary | ICD-10-CM

## 2023-12-28 PROCEDURE — 90480 ADMN SARSCOV2 VAC 1/ONLY CMP: CPT

## 2023-12-28 PROCEDURE — 91320 SARSCV2 VAC 30MCG TRS-SUC IM: CPT

## 2024-01-31 SDOH — HEALTH STABILITY: PHYSICAL HEALTH: ON AVERAGE, HOW MANY MINUTES DO YOU ENGAGE IN EXERCISE AT THIS LEVEL?: 80 MIN

## 2024-01-31 SDOH — HEALTH STABILITY: PHYSICAL HEALTH: ON AVERAGE, HOW MANY DAYS PER WEEK DO YOU ENGAGE IN MODERATE TO STRENUOUS EXERCISE (LIKE A BRISK WALK)?: 4 DAYS

## 2024-01-31 ASSESSMENT — SOCIAL DETERMINANTS OF HEALTH (SDOH): HOW OFTEN DO YOU GET TOGETHER WITH FRIENDS OR RELATIVES?: THREE TIMES A WEEK

## 2024-02-07 ENCOUNTER — OFFICE VISIT (OUTPATIENT)
Dept: FAMILY MEDICINE | Facility: CLINIC | Age: 69
End: 2024-02-07
Payer: MEDICARE

## 2024-02-07 VITALS
HEIGHT: 74 IN | RESPIRATION RATE: 16 BRPM | OXYGEN SATURATION: 95 % | WEIGHT: 217 LBS | TEMPERATURE: 97.7 F | HEART RATE: 70 BPM | SYSTOLIC BLOOD PRESSURE: 112 MMHG | DIASTOLIC BLOOD PRESSURE: 74 MMHG | BODY MASS INDEX: 27.85 KG/M2

## 2024-02-07 DIAGNOSIS — Z00.00 ROUTINE GENERAL MEDICAL EXAMINATION AT A HEALTH CARE FACILITY: Primary | ICD-10-CM

## 2024-02-07 DIAGNOSIS — E78.5 HYPERLIPIDEMIA LDL GOAL <70: ICD-10-CM

## 2024-02-07 DIAGNOSIS — R97.20 ELEVATED PROSTATE SPECIFIC ANTIGEN (PSA): ICD-10-CM

## 2024-02-07 LAB
ALBUMIN SERPL BCG-MCNC: 4.4 G/DL (ref 3.5–5.2)
ALP SERPL-CCNC: 72 U/L (ref 40–150)
ALT SERPL W P-5'-P-CCNC: 16 U/L (ref 0–70)
ANION GAP SERPL CALCULATED.3IONS-SCNC: 8 MMOL/L (ref 7–15)
AST SERPL W P-5'-P-CCNC: 16 U/L (ref 0–45)
BILIRUB SERPL-MCNC: 0.4 MG/DL
BUN SERPL-MCNC: 18.6 MG/DL (ref 8–23)
CALCIUM SERPL-MCNC: 9.2 MG/DL (ref 8.8–10.2)
CHLORIDE SERPL-SCNC: 105 MMOL/L (ref 98–107)
CHOLEST SERPL-MCNC: 205 MG/DL
CREAT SERPL-MCNC: 1.04 MG/DL (ref 0.67–1.17)
DEPRECATED HCO3 PLAS-SCNC: 28 MMOL/L (ref 22–29)
EGFRCR SERPLBLD CKD-EPI 2021: 79 ML/MIN/1.73M2
ERYTHROCYTE [DISTWIDTH] IN BLOOD BY AUTOMATED COUNT: 12 % (ref 10–15)
FASTING STATUS PATIENT QL REPORTED: YES
GLUCOSE SERPL-MCNC: 102 MG/DL (ref 70–99)
HBA1C MFR BLD: 5.3 % (ref 0–5.6)
HCT VFR BLD AUTO: 43.2 % (ref 40–53)
HDLC SERPL-MCNC: 43 MG/DL
HGB BLD-MCNC: 14.1 G/DL (ref 13.3–17.7)
LDLC SERPL CALC-MCNC: 135 MG/DL
MCH RBC QN AUTO: 29.4 PG (ref 26.5–33)
MCHC RBC AUTO-ENTMCNC: 32.6 G/DL (ref 31.5–36.5)
MCV RBC AUTO: 90 FL (ref 78–100)
NONHDLC SERPL-MCNC: 162 MG/DL
PLATELET # BLD AUTO: 160 10E3/UL (ref 150–450)
POTASSIUM SERPL-SCNC: 4.3 MMOL/L (ref 3.4–5.3)
PROT SERPL-MCNC: 7.4 G/DL (ref 6.4–8.3)
PSA SERPL DL<=0.01 NG/ML-MCNC: 3.61 NG/ML (ref 0–4.5)
RBC # BLD AUTO: 4.8 10E6/UL (ref 4.4–5.9)
SODIUM SERPL-SCNC: 141 MMOL/L (ref 135–145)
TRIGL SERPL-MCNC: 135 MG/DL
WBC # BLD AUTO: 5.4 10E3/UL (ref 4–11)

## 2024-02-07 PROCEDURE — 83036 HEMOGLOBIN GLYCOSYLATED A1C: CPT | Mod: GZ | Performed by: INTERNAL MEDICINE

## 2024-02-07 PROCEDURE — G0438 PPPS, INITIAL VISIT: HCPCS | Performed by: INTERNAL MEDICINE

## 2024-02-07 PROCEDURE — 85027 COMPLETE CBC AUTOMATED: CPT | Performed by: INTERNAL MEDICINE

## 2024-02-07 PROCEDURE — 80061 LIPID PANEL: CPT | Performed by: INTERNAL MEDICINE

## 2024-02-07 PROCEDURE — 36415 COLL VENOUS BLD VENIPUNCTURE: CPT | Performed by: INTERNAL MEDICINE

## 2024-02-07 PROCEDURE — 99213 OFFICE O/P EST LOW 20 MIN: CPT | Mod: 25 | Performed by: INTERNAL MEDICINE

## 2024-02-07 PROCEDURE — 84153 ASSAY OF PSA TOTAL: CPT | Performed by: INTERNAL MEDICINE

## 2024-02-07 PROCEDURE — 80053 COMPREHEN METABOLIC PANEL: CPT | Performed by: INTERNAL MEDICINE

## 2024-02-07 ASSESSMENT — PAIN SCALES - GENERAL: PAINLEVEL: NO PAIN (0)

## 2024-02-07 NOTE — LETTER
"February 8, 2024      Deep Chowdary  [address] [facility] [address]        Dear ,        We are writing to inform you of your test results.        The following letter pertains to your most recent diagnostic tests:     -Your total cholesterol is 205 which is above your goal of total cholesterol less than 200.     -Your triglycerides are 135 which are at your goal of triglycerides less than 150.     -Your HDL or \"good cholesterol\" is 43 which is at your goal of HDL cholesterol greater than 40.     -Your LDL cholesterol or \"bad cholesterol\" is 135 which is above your goal of LDL cholesterol less than 70.  Your LDL goal is based on your risk factors for artery disease.      -Liver and gallbladder tests are normal for you. (ALT,AST, Alk phos, bilirubin), kidney function is normal for you (Creatinine, GFR), Sodium is normal, Potassium is normal for you, Calcium is normal for you, Glucose (blood sugar) is normal for you.      -Your prostate specific antigen (PSA) test result returned normal and improved from last check.     -Your complete blood counts including your hemoglobin returned normal for you.       -Your hemoglobin A1c test which averages your blood sugars over the last 3 months returned normal.  No evidence for diabetes or prediabetes.       Bottom line:  The cholesterol is up since last check.  I think you should go back on the atorvastatin (Lipitor).  If you want to start by taking 20 mg daily and see if we achieve the goal of 50% reduction in \"bad cholesterol\" LDL on that dose, that would be reasonable.  We can increase the dose back to 40 mg if we do not achieve our goal on the lower dose.  Schedule a lab appointment to recheck the cholesterol after you have been back on the medication for at least one month.  I sent an prescription for 20 mg tablets to your pharmacy.      The other labs look OK         Resulted Orders   Lipid panel reflex to direct LDL Fasting   Result Value " Ref Range    Cholesterol 205 (H) <200 mg/dL    Triglycerides 135 <150 mg/dL    Direct Measure HDL 43 >=40 mg/dL    LDL Cholesterol Calculated 135 (H) <=100 mg/dL    Non HDL Cholesterol 162 (H) <130 mg/dL    Patient Fasting > 8hrs? Yes     Narrative    Cholesterol  Desirable:  <200 mg/dL    Triglycerides  Normal:  Less than 150 mg/dL  Borderline High:  150-199 mg/dL  High:  200-499 mg/dL  Very High:  Greater than or equal to 500 mg/dL    Direct Measure HDL  Female:  Greater than or equal to 50 mg/dL   Male:  Greater than or equal to 40 mg/dL    LDL Cholesterol  Desirable:  <100mg/dL  Above Desirable:  100-129 mg/dL   Borderline High:  130-159 mg/dL   High:  160-189 mg/dL   Very High:  >= 190 mg/dL    Non HDL Cholesterol  Desirable:  130 mg/dL  Above Desirable:  130-159 mg/dL  Borderline High:  160-189 mg/dL  High:  190-219 mg/dL  Very High:  Greater than or equal to 220 mg/dL   Comprehensive metabolic panel   Result Value Ref Range    Sodium 141 135 - 145 mmol/L      Comment:      Reference intervals for this test were updated on 09/26/2023 to more accurately reflect our healthy population. There may be differences in the flagging of prior results with similar values performed with this method. Interpretation of those prior results can be made in the context of the updated reference intervals.     Potassium 4.3 3.4 - 5.3 mmol/L    Carbon Dioxide (CO2) 28 22 - 29 mmol/L    Anion Gap 8 7 - 15 mmol/L    Urea Nitrogen 18.6 8.0 - 23.0 mg/dL    Creatinine 1.04 0.67 - 1.17 mg/dL    GFR Estimate 79 >60 mL/min/1.73m2    Calcium 9.2 8.8 - 10.2 mg/dL    Chloride 105 98 - 107 mmol/L    Glucose 102 (H) 70 - 99 mg/dL    Alkaline Phosphatase 72 40 - 150 U/L      Comment:      Reference intervals for this test were updated on 11/14/2023 to more accurately reflect our healthy population. There may be differences in the flagging of prior results with similar values performed with this method. Interpretation of those prior results can  be made in the context of the updated reference intervals.    AST 16 0 - 45 U/L      Comment:      Reference intervals for this test were updated on 6/12/2023 to more accurately reflect our healthy population. There may be differences in the flagging of prior results with similar values performed with this method. Interpretation of those prior results can be made in the context of the updated reference intervals.    ALT 16 0 - 70 U/L      Comment:      Reference intervals for this test were updated on 6/12/2023 to more accurately reflect our healthy population. There may be differences in the flagging of prior results with similar values performed with this method. Interpretation of those prior results can be made in the context of the updated reference intervals.      Protein Total 7.4 6.4 - 8.3 g/dL    Albumin 4.4 3.5 - 5.2 g/dL    Bilirubin Total 0.4 <=1.2 mg/dL   CBC with platelets   Result Value Ref Range    WBC Count 5.4 4.0 - 11.0 10e3/uL    RBC Count 4.80 4.40 - 5.90 10e6/uL    Hemoglobin 14.1 13.3 - 17.7 g/dL    Hematocrit 43.2 40.0 - 53.0 %    MCV 90 78 - 100 fL    MCH 29.4 26.5 - 33.0 pg    MCHC 32.6 31.5 - 36.5 g/dL    RDW 12.0 10.0 - 15.0 %    Platelet Count 160 150 - 450 10e3/uL   Hemoglobin A1c   Result Value Ref Range    Hemoglobin A1C 5.3 0.0 - 5.6 %      Comment:      Normal <5.7%   Prediabetes 5.7-6.4%    Diabetes 6.5% or higher     Note: Adopted from ADA consensus guidelines.   PSA tumor marker   Result Value Ref Range    PSA Tumor Marker 3.61 0.00 - 4.50 ng/mL    Narrative    This result is obtained using the Roche Elecsys total PSA method on the nancy e801 immunoassay analyzer. Results obtained with different assay methods or kits cannot be used interchangeably.       If you have any questions or concerns, please call the clinic at the number listed above.       Sincerely,      Grayson Mae MD

## 2024-02-07 NOTE — PROGRESS NOTES
Preventive Care Visit  [facility] CRAI Mae MD, Internal Medicine  Feb 7, 2024  {Provider  Link to SmartSet :738746}  {PROVIDER CHARTING PREFERENCE:815533}    Subjective   Deep is a 68 year old, presenting for the following:  Physical    {(!) Visit Details have not yet been documented.  Please enter Visit Details and then use this list to pull in documentation. (Optional):527225}  {ROOMER if patient is in their first year of Medicare a vision screen is required click here to document the Vison screen and then refresh the note to pull in results  :359094}    Health Care Directive  Patient has a Health Care Directive on file  {(AWV REQUIRED) Advanced Care Planning Reviewed:455014}    HPI  ***  {MA/LPN/RN Pre-Provider Visit Orders- hCG/UA/Strep (Optional):582476}  {SUPERLIST (Optional):707231}  {additonal problems for provider to add (Optional):697137}      1/31/2024   General Health   How would you rate your overall physical health? Good   Feel stress (tense, anxious, or unable to sleep) Not at all         1/31/2024   Nutrition   Diet: Regular (no restrictions)         1/31/2024   Exercise   Days per week of moderate/strenous exercise 4 days   Average minutes spent exercising at this level 80 min         1/31/2024   Social Factors   Frequency of gathering with friends or relatives Three times a week   Worry food won't last until get money to buy more No   Food not last or not have enough money for food? No   Do you have housing?  Yes   Are you worried about losing your housing? No   Lack of transportation? No   Unable to get utilities (heat,electricity)? No         1/31/2024   Fall Risk   Fallen 2 or more times in the past year? No   Trouble with walking or balance? No          1/31/2024   Activities of Daily Living- Home Safety   Needs help with the following daily activites None of the above   Safety concerns in the home None of the above         1/31/2024   Dental   Dentist two times  every year? Yes         1/31/2024   Hearing Screening   Hearing concerns? (!) IT'S HARD TO FOLLOW A CONVERSATION IN A NOISY RESTAURANT OR CROWDED ROOM.         1/31/2024   Driving Risk Screening   Patient/family members have concerns about driving No         1/31/2024   General Alertness/Fatigue Screening   Have you been more tired than usual lately? (!) YES         1/31/2024   Urinary Incontinence Screening   Bothered by leaking urine in past 6 months No         1/31/2024   TB Screening   Were you born outside of [place]?  No         Today's PHQ-2 Score:       2/6/2024     9:38 AM   PHQ-2 ( 1999 Pfizer)   Q1: Little interest or pleasure in doing things 0   Q2: Feeling down, depressed or hopeless 0   PHQ-2 Score 0   Q1: Little interest or pleasure in doing things Not at all   Q2: Feeling down, depressed or hopeless Not at all   PHQ-2 Score 0           1/31/2024   Substance Use   Alcohol more than 3/day or more than 7/wk Not Applicable   Do you have a current opioid prescription? No   How severe/bad is pain from 1 to 10? 3/10   Do you use any other substances recreationally? No     Social History     Tobacco Use    Smoking status: Former     Types: Cigars    Smokeless tobacco: Never    Tobacco comments:     in past has smoked cigarettes perhaps a pack a month   Vaping Use    Vaping Use: Never used   Substance Use Topics    Alcohol use: No     Comment: not for 20 years    Drug use: No     {If there are gaps in the social history shown above, please follow the link to update and then refresh the note Link to Social and Substance History :564131}  Last PSA:   PSA   Date Value Ref Range Status   10/16/2020 2.83 0 - 4 ug/L Final     Comment:     Assay Method:  Chemiluminescence using Siemens Vista analyzer     Prostate Specific Antigen Screen   Date Value Ref Range Status   11/09/2021 3.19 0.00 - 4.00 ug/L Final     PSA Tumor Marker   Date Value Ref Range Status   02/27/2023 4.86 (H) 0.00 - 4.50 ng/mL Final     The 10-year  ASCVD risk score (Marjorie OAKES, et al., 2019) is: 11.8%    Values used to calculate the score:      Age: 68 years      Sex: Male      Is Non- : No      Diabetic: No      Tobacco smoker: No      Systolic Blood Pressure: 120 mmHg      Is BP treated: No      HDL Cholesterol: 41 mg/dL      Total Cholesterol: 159 mg/dL    {Link to Fracture Risk Assessment Tool (Optional):445717}    {Use the storyboard to review patient history, after sections have been marked as reviewed, refresh note to capture documentation:428060}  { REQUIRED AWV use this link to review and update sexual activity history  after section has been marked as reviewed, refresh note to capture documentation:220303}  Reviewed and updated as needed this visit by Provider                    {HISTORY OPTIONS (Optional):857387}  Current providers sharing in care for this patient include:  Patient Care Team:  Grayson Mae MD as PCP - General (Internal Medicine)  Grayson Mae MD as Assigned PCP  Shaw Fletcher MD as MD (Urology)  Shaw Fletcher MD as Assigned Surgical Provider    The following health maintenance items are reviewed in Epic and correct as of today:  Health Maintenance   Topic Date Due    RSV VACCINE (Pregnancy & 60+) (1 - 1-dose 60+ series) Never done    MEDICARE ANNUAL WELLNESS VISIT  01/02/2024    ANNUAL REVIEW OF HM ORDERS  01/02/2024    LIPID  06/27/2024    FALL RISK ASSESSMENT  02/07/2025    GLUCOSE  02/27/2026    DTAP/TDAP/TD IMMUNIZATION (3 - Td or Tdap) 07/10/2027    COLORECTAL CANCER SCREENING  10/26/2027    ADVANCE CARE PLANNING  01/02/2028    HEPATITIS C SCREENING  Completed    PHQ-2 (once per calendar year)  Completed    INFLUENZA VACCINE  Completed    Pneumococcal Vaccine: 65+ Years  Completed    ZOSTER IMMUNIZATION  Completed    AORTIC ANEURYSM SCREENING (SYSTEM ASSIGNED)  Completed    COVID-19 Vaccine  Completed    IPV IMMUNIZATION  Aged Out    HPV IMMUNIZATION  Aged Out    MENINGITIS  "IMMUNIZATION  Aged Out    RSV MONOCLONAL ANTIBODY  Aged Out    LUNG CANCER SCREENING  Discontinued     Review of Systems  {ROS Picklists (Optional):020223}     Objective    Exam  There were no vitals taken for this visit.   Estimated body mass index is 26.89 kg/m  as calculated from the following:    Height as of 3/8/23: 1.873 m (6' 1.75\").    Weight as of 3/8/23: 94.3 kg (208 lb).    Physical Exam  {Exam Choices (Optional):512835}  {The Mini-Cog is incomplete, use link to complete and refresh note Link to Mini-Cog :387143}       No data to display              {A Mini-Cog total score of 0-2 suggests the possibility of dementia, score of 3-5 suggests no dementia:173654}       Signed Electronically by: Grayson Mae MD  {Email feedback regarding this note to [email or web address]   :706346}  "

## 2024-02-07 NOTE — PROGRESS NOTES
"Preventive Care Visit  [facility]  Grayson Mae MD, Internal Medicine  Feb 7, 2024    Assessment & Plan     Routine general medical examination at a health care facility    - Lipid panel reflex to direct LDL Fasting; Future  - Comprehensive metabolic panel; Future  - CBC with platelets; Future  - Hemoglobin A1c; Future    Elevated prostate specific antigen (PSA)    - PSA tumor marker; Future    Hyperlipidemia LDL goal <70  Long discussion about rationale for statin therapy for primary   His leg symptoms for which he stopped taking atorvastatin (Lipitor) persist unchanged even after stopping atorvastatin (Lipitor) , so he has convinced himself that there may not be a causal relationship there  He is willing to go back on atorvastatin (Lipitor) pending ASCVD risk calculated with today's lipid panel result       No LOS data to display   Time spent by me doing chart review, history and exam, documentation and further activities per the note      BMI  Estimated body mass index is 28.05 kg/m  as calculated from the following:    Height as of this encounter: 1.873 m (6' 1.75\").    Weight as of this encounter: 98.4 kg (217 lb).   Weight management plan: Discussed healthy diet and exercise guidelines    Counseling  Appropriate preventive services were discussed with this patient, including applicable screening as appropriate for fall prevention, nutrition, physical activity, Tobacco-use cessation, weight loss and cognition.  Checklist reviewing preventive services available has been given to the patient.  Reviewed patient's diet, addressing concerns and/or questions.   Discussed possible causes of fatigue. The patient was provided with written information regarding signs of hearing loss.         FUTURE APPOINTMENTS:       - Follow-up for annual visit or as needed  -Colonoscopy will be due 10/2025  -Discussed RSV shot at pharmacy     Subjective   Bill is a 68 year old, presenting for the " following:  Physical          Health Care Directive  Patient has a Health Care Directive on file      HPI              1/31/2024   General Health   How would you rate your overall physical health? Good   Feel stress (tense, anxious, or unable to sleep) Not at all         1/31/2024   Nutrition   Diet: Regular (no restrictions)         1/31/2024   Exercise   Days per week of moderate/strenous exercise 4 days   Average minutes spent exercising at this level 80 min         1/31/2024   Social Factors   Frequency of gathering with friends or relatives Three times a week   Worry food won't last until get money to buy more No   Food not last or not have enough money for food? No   Do you have housing?  Yes   Are you worried about losing your housing? No   Lack of transportation? No   Unable to get utilities (heat,electricity)? No         1/31/2024   Fall Risk   Fallen 2 or more times in the past year? No   Trouble with walking or balance? No          1/31/2024   Activities of Daily Living- Home Safety   Needs help with the following daily activites None of the above   Safety concerns in the home None of the above         1/31/2024   Dental   Dentist two times every year? Yes         1/31/2024   Hearing Screening   Hearing concerns? (!) IT'S HARD TO FOLLOW A CONVERSATION IN A NOISY [employer]AUR[facility] OR CROWDED ROOM.         1/31/2024   Driving Risk Screening   Patient/family members have concerns about driving No         1/31/2024   General Alertness/Fatigue Screening   Have you been more tired than usual lately? (!) YES         1/31/2024   Urinary Incontinence Screening   Bothered by leaking urine in past 6 months No         1/31/2024   TB Screening   Were you born outside of [place]?  No         Today's PHQ-2 Score:       2/6/2024     9:38 AM   PHQ-2 ( 1999 Pfizer)   Q1: Little interest or pleasure in doing things 0   Q2: Feeling down, depressed or hopeless 0   PHQ-2 Score 0   Q1: Little interest or pleasure in doing things Not at all    Q2: Feeling down, depressed or hopeless Not at all   PHQ-2 Score 0           1/31/2024   Substance Use   Alcohol more than 3/day or more than 7/wk Not Applicable   Do you have a current opioid prescription? No   How severe/bad is pain from 1 to 10? 3/10   Do you use any other substances recreationally? No     Social History     Tobacco Use    Smoking status: Former     Types: Cigars    Smokeless tobacco: Never    Tobacco comments:     in past has smoked cigarettes perhaps a pack a month   Vaping Use    Vaping Use: Never used   Substance Use Topics    Alcohol use: No     Comment: not for 20 years    Drug use: No       Last PSA:   PSA   Date Value Ref Range Status   10/16/2020 2.83 0 - 4 ug/L Final     Comment:     Assay Method:  Chemiluminescence using Siemens Vista analyzer     Prostate Specific Antigen Screen   Date Value Ref Range Status   11/09/2021 3.19 0.00 - 4.00 ug/L Final     PSA Tumor Marker   Date Value Ref Range Status   02/27/2023 4.86 (H) 0.00 - 4.50 ng/mL Final     The 10-year ASCVD risk score (Marjorie OAKES, et al., 2019) is: 11.8%    Values used to calculate the score:      Age: 68 years      Sex: Male      Is Non- : No      Diabetic: No      Tobacco smoker: No      Systolic Blood Pressure: 120 mmHg      Is BP treated: No      HDL Cholesterol: 41 mg/dL      Total Cholesterol: 159 mg/dL            Reviewed and updated as needed this visit by Provider                    Past Medical History:   Diagnosis Date    Atrial fibrillation (H)     s/p catheter ablation 1/2014    Cancer (H)     basal cell  removed from back x 2    Primary osteoarthritis of both knees 07/10/2017    Sarcoidosis 07/10/2017     Past Surgical History:   Procedure Laterality Date    ABDOMEN SURGERY  1957    pyloric stenosis    APPENDECTOMY  6/17/1995    BACK SURGERY  2008    fusion C5-6-7    BIOPSY  2018    skin cancer    CARDIAC SURGERY      ablation for afib    COLONOSCOPY  2022    EYE SURGERY Bilateral 02/2018     HEAD & NECK SURGERY      fuse neck c5,c6,c7    HERNIA REPAIR      ORTHOPEDIC SURGERY      scoped left knee for meniscus tear    ZZC NONSPECIFIC PROCEDURE      pyloric stenosis repair    ZZC NONSPECIFIC PROCEDURE      right inguinal hernia    ZZC NONSPECIFIC PROCEDURE      bladder surgery    ZZC NONSPECIFIC PROCEDURE      emergency appendectomy     BP Readings from Last 3 Encounters:   24 112/74   23 120/74   23 96/76    Wt Readings from Last 3 Encounters:   24 98.4 kg (217 lb)   23 94.3 kg (208 lb)   23 93.9 kg (207 lb)                  Patient Active Problem List   Diagnosis    Paroxysmal atrial fibrillation (H)    Sarcoidosis    Primary osteoarthritis of both knees    Status post total knee replacement, bilateral     Past Surgical History:   Procedure Laterality Date    ABDOMEN SURGERY      pyloric stenosis    APPENDECTOMY  1995    BACK SURGERY      fusion C5-6-7    BIOPSY  2018    skin cancer    CARDIAC SURGERY      ablation for afib    COLONOSCOPY      EYE SURGERY Bilateral 2018    HEAD & NECK SURGERY      fuse neck c5,c6,c7    HERNIA REPAIR      ORTHOPEDIC SURGERY      scoped left knee for meniscus tear    ZZC NONSPECIFIC PROCEDURE      pyloric stenosis repair    ZZC NONSPECIFIC PROCEDURE      right inguinal hernia    ZZC NONSPECIFIC PROCEDURE      bladder surgery    ZZC NONSPECIFIC PROCEDURE      emergency appendectomy       Social History     Tobacco Use    Smoking status: Former     Types: Cigars    Smokeless tobacco: Never    Tobacco comments:     in past has smoked cigarettes perhaps a pack a month   Substance Use Topics    Alcohol use: No     Comment: not for 20 years     Family History   Problem Relation Age of Onset    Cancer Mother         colon cancer 73    Colon Cancer Mother     Neurologic Disorder Father         dad had ALS,  at 60    Genetic Disorder Father         ALS    Cardiovascular Maternal Grandfather     Arthritis  Paternal Grandmother     Cancer Paternal Grandfather         lung cancer    Gastrointestinal Disease Sister     Depression Sister         PTSD    Cardiovascular Sister         whole in heart    Hyperlipidemia Brother     Prostate Cancer Brother     Hyperlipidemia Brother     Hyperlipidemia Brother          Current Outpatient Medications   Medication Sig Dispense Refill    acetaminophen (TYLENOL) 500 MG tablet Take 500-1,000 mg by mouth every 6 hours as needed for mild pain      atorvastatin (LIPITOR) 40 MG tablet Take 1 tablet (40 mg) by mouth daily 90 tablet 3    calcium carbonate 600 mg-vitamin D 400 units 600-400 MG-UNIT per tablet       imiquimod (ALDARA) 5 % external cream APPLY TO AREA AT BEDTIME ON MONDAY-FRIDAY AND WASH OFF IN THE MORNING FOR 6 WEEKS      VITAMIN D PO        Current providers sharing in care for this patient include:  Patient Care Team:  Grayson Mae MD as PCP - General (Internal Medicine)  Grayson Mae MD as Assigned PCP  Shaw Fletcher MD as MD (Urology)  Shaw Fletcher MD as Assigned Surgical Provider    The following health maintenance items are reviewed in Epic and correct as of today:  Health Maintenance   Topic Date Due    RSV VACCINE (Pregnancy & 60+) (1 - 1-dose 60+ series) Never done    MEDICARE ANNUAL WELLNESS VISIT  01/02/2024    ANNUAL REVIEW OF HM ORDERS  01/02/2024    LIPID  06/27/2024    FALL RISK ASSESSMENT  02/07/2025    GLUCOSE  02/27/2026    DTAP/TDAP/TD IMMUNIZATION (3 - Td or Tdap) 07/10/2027    COLORECTAL CANCER SCREENING  10/26/2027    ADVANCE CARE PLANNING  01/02/2028    HEPATITIS C SCREENING  Completed    PHQ-2 (once per calendar year)  Completed    INFLUENZA VACCINE  Completed    Pneumococcal Vaccine: 65+ Years  Completed    ZOSTER IMMUNIZATION  Completed    AORTIC ANEURYSM SCREENING (SYSTEM ASSIGNED)  Completed    COVID-19 Vaccine  Completed    IPV IMMUNIZATION  Aged Out    HPV IMMUNIZATION  Aged Out    MENINGITIS IMMUNIZATION  Aged Out    RSV  "MONOCLONAL ANTIBODY  Aged Out    LUNG CANCER SCREENING  Discontinued     Review of Systems    Review of Systems  Constitutional, HEENT, cardiovascular, pulmonary, gi and gu systems are negative, except as otherwise noted.     Objective    Exam  There were no vitals taken for this visit.   Estimated body mass index is 26.89 kg/m  as calculated from the following:    Height as of 3/8/23: 1.873 m (6' 1.75\").    Weight as of 3/8/23: 94.3 kg (208 lb).    Physical Exam  GENERAL: alert and no distress  EYES: Eyes grossly normal to inspection, PERRL and conjunctivae and sclerae normal  HENT: ear canals and TM's normal, nose and mouth without ulcers or lesions  NECK: no adenopathy, no asymmetry, masses, or scars  RESP: lungs clear to auscultation - no rales, rhonchi or wheezes  CV: regular rate and rhythm, normal S1 S2, no S3 or S4, no murmur, click or rub, no peripheral edema  ABDOMEN: soft, nontender, no hepatosplenomegaly, no masses and bowel sounds normal  RECTAL: normal sphincter tone, no rectal masses, prostate normal size, smooth, nontender without nodules or masses  MS: no gross musculoskeletal defects noted, no edema  SKIN: no suspicious lesions or rashes  NEURO: Normal strength and tone, mentation intact and speech normal  PSYCH: mentation appears normal, affect normal/bright        2/7/2024   Mini Cog   Clock Draw Score 2 Normal   3 Item Recall 2 objects recalled   Mini Cog Total Score 4            Signed Electronically by: Grayson Mae MD    "

## 2024-02-08 NOTE — RESULT ENCOUNTER NOTE
"The following letter pertains to your most recent diagnostic tests:    -Your total cholesterol is 205 which is above your goal of total cholesterol less than 200.    -Your triglycerides are 135 which are at your goal of triglycerides less than 150.    -Your HDL or \"good cholesterol\" is 43 which is at your goal of HDL cholesterol greater than 40.    -Your LDL cholesterol or \"bad cholesterol\" is 135 which is above your goal of LDL cholesterol less than 70.  Your LDL goal is based on your risk factors for artery disease.    -Liver and gallbladder tests are normal for you. (ALT,AST, Alk phos, bilirubin), kidney function is normal for you (Creatinine, GFR), Sodium is normal, Potassium is normal for you, Calcium is normal for you, Glucose (blood sugar) is normal for you.      -Your prostate specific antigen (PSA) test result returned normal and improved from last check.     -Your complete blood counts including your hemoglobin returned normal for you.       -Your hemoglobin A1c test which averages your blood sugars over the last 3 months returned normal.  No evidence for diabetes or prediabetes.       Bottom line:  The cholesterol is up since last check.  I think you should go back on the atorvastatin (Lipitor).  If you want to start by taking 20 mg daily and see if we achieve the goal of 50% reduction in \"bad cholesterol\" LDL on that dose, that would be reasonable.  We can increase the dose back to 40 mg if we do not achieve our goal on the lower dose.  Schedule a lab appointment to recheck the cholesterol after you have been back on the medication for at least one month.  I sent an prescription for 20 mg tablets to your pharmacy.      The other labs look OK      Sincerely,    Dr. Mae      The 10-year ASCVD risk score (Marjorie DK, et al., 2019) is: 12.1%    Values used to calculate the score:      Age: 68 years      Sex: Male      Is Non- : No      Diabetic: No      Tobacco smoker: No      " Systolic Blood Pressure: 112 mmHg      Is BP treated: No      HDL Cholesterol: 43 mg/dL      Total Cholesterol: 205 mg/dL

## 2024-10-12 ENCOUNTER — IMMUNIZATION (OUTPATIENT)
Dept: FAMILY MEDICINE | Facility: CLINIC | Age: 69
End: 2024-10-12
Payer: MEDICARE

## 2024-10-12 DIAGNOSIS — Z23 HIGH PRIORITY FOR 2019-NCOV VACCINE: Primary | ICD-10-CM

## 2024-10-12 DIAGNOSIS — Z23 ENCOUNTER FOR IMMUNIZATION: ICD-10-CM

## 2024-10-12 PROCEDURE — 90480 ADMN SARSCOV2 VAC 1/ONLY CMP: CPT

## 2024-10-12 PROCEDURE — 99207 PR NO CHARGE NURSE ONLY: CPT

## 2024-10-12 PROCEDURE — 91320 SARSCV2 VAC 30MCG TRS-SUC IM: CPT

## 2024-10-12 NOTE — PROGRESS NOTES
Prior to immunization administration, verified patients identity using patient s name and date of birth. Please see Immunization Activity for additional information.     Is the patient's temperature normal (100.5 or less)? Yes     Patient MEETS CRITERIA. PROCEED with vaccine administration.      Patient instructed to remain in clinic for 15 minutes afterwards, and to report any adverse reactions.      Link to Ancillary Visit Immunization Standing Orders SmartSet     Screening performed by Irais Herndon CMA on 10/12/2024 at 8:00 AM.

## 2024-12-30 ENCOUNTER — APPOINTMENT (OUTPATIENT)
Age: 69
Setting detail: DERMATOLOGY
End: 2024-12-30

## 2024-12-30 DIAGNOSIS — Z71.89 OTHER SPECIFIED COUNSELING: ICD-10-CM

## 2024-12-30 DIAGNOSIS — D18.0 HEMANGIOMA: ICD-10-CM

## 2024-12-30 DIAGNOSIS — Z85.828 PERSONAL HISTORY OF OTHER MALIGNANT NEOPLASM OF SKIN: ICD-10-CM

## 2024-12-30 DIAGNOSIS — D22 MELANOCYTIC NEVI: ICD-10-CM

## 2024-12-30 DIAGNOSIS — L82.1 OTHER SEBORRHEIC KERATOSIS: ICD-10-CM

## 2024-12-30 DIAGNOSIS — Z85.820 PERSONAL HISTORY OF MALIGNANT MELANOMA OF SKIN: ICD-10-CM

## 2024-12-30 PROBLEM — D22.5 MELANOCYTIC NEVI OF TRUNK: Status: ACTIVE | Noted: 2024-12-30

## 2024-12-30 PROBLEM — D18.01 HEMANGIOMA OF SKIN AND SUBCUTANEOUS TISSUE: Status: ACTIVE | Noted: 2024-12-30

## 2024-12-30 PROCEDURE — ? OBSERVATION

## 2024-12-30 PROCEDURE — ? SUNSCREEN RECOMMENDATIONS

## 2024-12-30 PROCEDURE — ? COUNSELING

## 2024-12-30 PROCEDURE — 99213 OFFICE O/P EST LOW 20 MIN: CPT

## 2024-12-30 ASSESSMENT — LOCATION DETAILED DESCRIPTION DERM
LOCATION DETAILED: LEFT SUPERIOR UPPER BACK
LOCATION DETAILED: RIGHT SUPERIOR MEDIAL MIDBACK
LOCATION DETAILED: INFERIOR THORACIC SPINE
LOCATION DETAILED: SUPERIOR THORACIC SPINE
LOCATION DETAILED: EPIGASTRIC SKIN
LOCATION DETAILED: LEFT INFERIOR MEDIAL FOREHEAD
LOCATION DETAILED: MIDDLE STERNUM

## 2024-12-30 ASSESSMENT — LOCATION SIMPLE DESCRIPTION DERM
LOCATION SIMPLE: CHEST
LOCATION SIMPLE: ABDOMEN
LOCATION SIMPLE: LEFT UPPER BACK
LOCATION SIMPLE: UPPER BACK
LOCATION SIMPLE: RIGHT LOWER BACK
LOCATION SIMPLE: LEFT FOREHEAD

## 2024-12-30 ASSESSMENT — LOCATION ZONE DERM
LOCATION ZONE: FACE
LOCATION ZONE: TRUNK

## 2024-12-30 NOTE — HPI: MELANOMA F/U (HISTORY OF MALIGNANT MELANOMA)
What Stage Is The Melanoma?: Stage 0
What Is The Reason For Today's Visit?: Follow Up Melanoma
Year Excised?: 1/30/2023
Additional History: Excised by Dr. Rose

## 2024-12-30 NOTE — PROCEDURE: OBSERVATION
Body Location Override (Optional - Billing Will Still Be Based On Selected Body Map Location If Applicable): right forearm, the left upper back, and the left chest
Detail Level: Detailed
Size Of Lesion In Cm (Optional): 0
Body Location Override (Optional - Billing Will Still Be Based On Selected Body Map Location If Applicable): mid upper back

## 2024-12-30 NOTE — HPI: FULL BODY SKIN EXAMINATION
What Type Of Note Output Would You Prefer (Optional)?: Bullet Format
What Is The Reason For Today's Visit?: Full Body Skin Examination
What Is The Reason For Today's Visit? (Being Monitored For X): surveillance against the recurrence of atypical nevi
How Severe Are Your Spot(S)?: mild
Additional History: The patient has a history of skin cancer on the right forearm, the left upper back, and the left chest. 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

## 2025-02-07 SDOH — HEALTH STABILITY: PHYSICAL HEALTH: ON AVERAGE, HOW MANY DAYS PER WEEK DO YOU ENGAGE IN MODERATE TO STRENUOUS EXERCISE (LIKE A BRISK WALK)?: 4 DAYS

## 2025-02-07 SDOH — HEALTH STABILITY: PHYSICAL HEALTH: ON AVERAGE, HOW MANY MINUTES DO YOU ENGAGE IN EXERCISE AT THIS LEVEL?: 50 MIN

## 2025-02-07 ASSESSMENT — SOCIAL DETERMINANTS OF HEALTH (SDOH): HOW OFTEN DO YOU GET TOGETHER WITH FRIENDS OR RELATIVES?: ONCE A WEEK

## 2025-02-12 ENCOUNTER — OFFICE VISIT (OUTPATIENT)
Dept: FAMILY MEDICINE | Facility: CLINIC | Age: 70
End: 2025-02-12
Payer: MEDICARE

## 2025-02-12 VITALS
HEIGHT: 73 IN | RESPIRATION RATE: 16 BRPM | SYSTOLIC BLOOD PRESSURE: 118 MMHG | DIASTOLIC BLOOD PRESSURE: 77 MMHG | OXYGEN SATURATION: 94 % | TEMPERATURE: 98.7 F | HEART RATE: 68 BPM | BODY MASS INDEX: 29.5 KG/M2 | WEIGHT: 222.6 LBS

## 2025-02-12 DIAGNOSIS — G25.81 RESTLESS LEG SYNDROME: ICD-10-CM

## 2025-02-12 DIAGNOSIS — Z12.11 SCREENING FOR COLON CANCER: ICD-10-CM

## 2025-02-12 DIAGNOSIS — Z12.5 SCREENING FOR PROSTATE CANCER: ICD-10-CM

## 2025-02-12 DIAGNOSIS — Z13.1 SCREENING FOR DIABETES MELLITUS: ICD-10-CM

## 2025-02-12 DIAGNOSIS — Z00.00 ENCOUNTER FOR MEDICARE ANNUAL WELLNESS EXAM: Primary | ICD-10-CM

## 2025-02-12 DIAGNOSIS — G89.29 CHRONIC LEFT SHOULDER PAIN: ICD-10-CM

## 2025-02-12 DIAGNOSIS — I48.0 PAROXYSMAL ATRIAL FIBRILLATION (H): ICD-10-CM

## 2025-02-12 DIAGNOSIS — E78.5 HYPERLIPIDEMIA LDL GOAL <70: ICD-10-CM

## 2025-02-12 DIAGNOSIS — M25.512 CHRONIC LEFT SHOULDER PAIN: ICD-10-CM

## 2025-02-12 LAB
ALBUMIN SERPL BCG-MCNC: 4.2 G/DL (ref 3.5–5.2)
ALP SERPL-CCNC: 87 U/L (ref 40–150)
ALT SERPL W P-5'-P-CCNC: 32 U/L (ref 0–70)
ANION GAP SERPL CALCULATED.3IONS-SCNC: 12 MMOL/L (ref 7–15)
AST SERPL W P-5'-P-CCNC: 24 U/L (ref 0–45)
BILIRUB SERPL-MCNC: 0.6 MG/DL
BUN SERPL-MCNC: 18 MG/DL (ref 8–23)
CALCIUM SERPL-MCNC: 9.3 MG/DL (ref 8.8–10.4)
CHLORIDE SERPL-SCNC: 105 MMOL/L (ref 98–107)
CHOLEST SERPL-MCNC: 138 MG/DL
CREAT SERPL-MCNC: 1.08 MG/DL (ref 0.67–1.17)
EGFRCR SERPLBLD CKD-EPI 2021: 75 ML/MIN/1.73M2
ERYTHROCYTE [DISTWIDTH] IN BLOOD BY AUTOMATED COUNT: 12 % (ref 10–15)
EST. AVERAGE GLUCOSE BLD GHB EST-MCNC: 111 MG/DL
FASTING STATUS PATIENT QL REPORTED: YES
FASTING STATUS PATIENT QL REPORTED: YES
GLUCOSE SERPL-MCNC: 95 MG/DL (ref 70–99)
HBA1C MFR BLD: 5.5 % (ref 0–5.6)
HCO3 SERPL-SCNC: 25 MMOL/L (ref 22–29)
HCT VFR BLD AUTO: 42.8 % (ref 40–53)
HDLC SERPL-MCNC: 47 MG/DL
HGB BLD-MCNC: 13.8 G/DL (ref 13.3–17.7)
LDLC SERPL CALC-MCNC: 69 MG/DL
MCH RBC QN AUTO: 28.8 PG (ref 26.5–33)
MCHC RBC AUTO-ENTMCNC: 32.2 G/DL (ref 31.5–36.5)
MCV RBC AUTO: 89 FL (ref 78–100)
NONHDLC SERPL-MCNC: 91 MG/DL
PLATELET # BLD AUTO: 144 10E3/UL (ref 150–450)
POTASSIUM SERPL-SCNC: 4.2 MMOL/L (ref 3.4–5.3)
PROT SERPL-MCNC: 7.2 G/DL (ref 6.4–8.3)
PSA SERPL DL<=0.01 NG/ML-MCNC: 2.99 NG/ML (ref 0–4.5)
RBC # BLD AUTO: 4.79 10E6/UL (ref 4.4–5.9)
SODIUM SERPL-SCNC: 142 MMOL/L (ref 135–145)
TRIGL SERPL-MCNC: 108 MG/DL
WBC # BLD AUTO: 5.8 10E3/UL (ref 4–11)

## 2025-02-12 PROCEDURE — 36415 COLL VENOUS BLD VENIPUNCTURE: CPT | Performed by: INTERNAL MEDICINE

## 2025-02-12 PROCEDURE — 85027 COMPLETE CBC AUTOMATED: CPT | Performed by: INTERNAL MEDICINE

## 2025-02-12 PROCEDURE — 80061 LIPID PANEL: CPT | Performed by: INTERNAL MEDICINE

## 2025-02-12 PROCEDURE — 80053 COMPREHEN METABOLIC PANEL: CPT | Performed by: INTERNAL MEDICINE

## 2025-02-12 PROCEDURE — G0103 PSA SCREENING: HCPCS | Performed by: INTERNAL MEDICINE

## 2025-02-12 PROCEDURE — 83036 HEMOGLOBIN GLYCOSYLATED A1C: CPT | Performed by: INTERNAL MEDICINE

## 2025-02-12 RX ORDER — GABAPENTIN 100 MG/1
CAPSULE ORAL
Qty: 90 CAPSULE | Refills: 3 | Status: SHIPPED | OUTPATIENT
Start: 2025-02-12

## 2025-02-12 ASSESSMENT — PAIN SCALES - GENERAL: PAINLEVEL_OUTOF10: NO PAIN (0)

## 2025-02-12 NOTE — PATIENT INSTRUCTIONS
If you don't want to do formal physical therapy for your left shoulder pain try the exercises in the following video to see if they help.  If they do not help enough, please contact us for formal PT referral.       [email or web address]    You should get the RSV (Respiratory Syncytial Virus) vaccine at a pharmacy.     Patient Education   Preventive Care Advice   This is general advice given by our system to help you stay healthy. However, your care team may have specific advice just for you. Please talk to your care team about your preventive care needs.  Nutrition  Eat 5 or more servings of fruits and vegetables each day.  Try wheat bread, brown rice and whole grain pasta (instead of white bread, rice, and pasta).  Get enough calcium and vitamin D. Check the label on foods and aim for 100% of the RDA (recommended daily allowance).  Lifestyle  Exercise at least 150 minutes each week  (30 minutes a day, 5 days a week).  Do muscle strengthening activities 2 days a week. These help control your weight and prevent disease.  No smoking.  Wear sunscreen to prevent skin cancer.  Have a dental exam and cleaning every 6 months.  Yearly exams  See your health care team every year to talk about:  Any changes in your health.  Any medicines your care team has prescribed.  Preventive care, family planning, and ways to prevent chronic diseases.  Shots (vaccines)   HPV shots (up to age 26), if you've never had them before.  Hepatitis B shots (up to age 59), if you've never had them before.  COVID-19 shot: Get this shot when it's due.  Flu shot: Get a flu shot every year.  Tetanus shot: Get a tetanus shot every 10 years.  Pneumococcal, hepatitis A, and RSV shots: Ask your care team if you need these based on your risk.  Shingles shot (for age 50 and up)  General health tests  Diabetes screening:  Starting at age 35, Get screened for diabetes at least every 3 years.  If you are younger than age 35, ask your  care team if you should be screened for diabetes.  Cholesterol test: At age 39, start having a cholesterol test every 5 years, or more often if advised.  Bone density scan (DEXA): At age 50, ask your care team if you should have this scan for osteoporosis (brittle bones).  Hepatitis C: Get tested at least once in your life.  STIs (sexually transmitted infections)  Before age 24: Ask your care team if you should be screened for STIs.  After age 24: Get screened for STIs if you're at risk. You are at risk for STIs (including HIV) if:  You are sexually active with more than one person.  You don't use condoms every time.  You or a partner was diagnosed with a sexually transmitted infection.  If you are at risk for HIV, ask about PrEP medicine to prevent HIV.  Get tested for HIV at least once in your life, whether you are at risk for HIV or not.  Cancer screening tests  Cervical cancer screening: If you have a cervix, begin getting regular cervical cancer screening tests starting at age 21.  Breast cancer scan (mammogram): If you've ever had breasts, begin having regular mammograms starting at age 40. This is a scan to check for breast cancer.  Colon cancer screening: It is important to start screening for colon cancer at age 45.  Have a colonoscopy test every 10 years (or more often if you're at risk) Or, ask your provider about stool tests like a FIT test every year or Cologuard test every 3 years.  To learn more about your testing options, visit:   .  For help making a decision, visit:   https://bit.ly/rx71439.  Prostate cancer screening test: If you have a prostate, ask your care team if a prostate cancer screening test (PSA) at age 55 is right for you.  Lung cancer screening: If you are a current or former smoker ages 50 to 80, ask your care team if ongoing lung cancer screenings are right for you.  For informational purposes only. Not to replace the advice of your health care provider. Copyright   2023 [employer]. All rights reserved. Clinically reviewed by the [facility]. [place] 850007 - REV 01/24.  Hearing Loss: Care Instructions  Overview     Hearing loss is a sudden or slow decrease in how well you hear. It can range from slight to profound. Permanent hearing loss can occur with aging. It also can happen when you are exposed long-term to loud noise. Examples include listening to loud music, riding motorcycles, or being around other loud machines.  Hearing loss can affect your work and home life. It can make you feel lonely or depressed. You may feel that you have lost your independence. But hearing aids and other devices can help you hear better and feel connected to others.  Follow-up care is a key part of your treatment and safety. Be sure to make and go to all appointments, and call your doctor if you are having problems. It's also a good idea to know your test results and keep a list of the medicines you take.  How can you care for yourself at home?  Avoid loud noises whenever possible. This helps keep your hearing from getting worse.  Always wear hearing protection around loud noises.  Wear a hearing aid as directed.  A professional can help you pick a hearing aid that will work best for you.  You can also get hearing aids over the counter for mild to moderate hearing loss.  Have hearing tests as your doctor suggests. They can show whether your hearing has changed. Your hearing aid may need to be adjusted.  Use other devices as needed. These may include:  Telephone amplifiers and hearing aids that can connect to a television, stereo, radio, or microphone.  Devices that use lights or vibrations. These alert you to the doorbell, a ringing telephone, or a baby monitor.  Television closed-captioning. This shows the words at the bottom of the screen. Most new TVs can do this.  TTY (text telephone). This lets you type messages back and forth on the telephone instead of  "talking or listening. These devices are also called TDD. When messages are typed on the keyboard, they are sent over the phone line to a receiving TTY. The message is shown on a monitor.  Use text messaging, social media, and email if it is hard for you to communicate by telephone.  Try to learn a listening technique called speechreading. It is not lipreading. You pay attention to people's gestures, expressions, posture, and tone of voice. These clues can help you understand what a person is saying. Face the person you are talking to, and have them face you. Make sure the lighting is good. You need to see the other person's face clearly.  Think about counseling if you need help to adjust to your hearing loss.  When should you call for help?  Watch closely for changes in your health, and be sure to contact your doctor if:    You think your hearing is getting worse.     You have new symptoms, such as dizziness or nausea.   Where can you learn more?  Go to https://www.[employer].net/patiented  Enter R798 in the search box to learn more about \"Hearing Loss: Care Instructions.\"  Current as of: September 27, 2023  Content Version: 14.3    2024 [employer].   Care instructions adapted under license by your healthcare professional. If you have questions about a medical condition or this instruction, always ask your healthcare professional. [employer] disclaims any warranty or liability for your use of this information.       "

## 2025-02-12 NOTE — PROGRESS NOTES
"Preventive Care Visit  [facility]  Grayson Mae MD, Internal Medicine  Feb 12, 2025      Assessment & Plan     Encounter for Medicare annual wellness exam      Paroxysmal atrial fibrillation (H)  No symptoms since ablation procedure     Hyperlipidemia LDL goal <70  On 20 mg of atorvastatin (Lipitor) ; recheck lipids   - Lipid panel reflex to direct LDL Non-fasting; Future  - Comprehensive metabolic panel; Future  - CBC with platelets; Future  - OFFICE/OUTPT VISIT,EST,LEVL IV    Restless leg syndrome  Feeling of burning in legs at night after 5 hours of sleep, relieved instantly by getting up and walking  Symptoms for years, more severe recently, nightly symptoms for last few months disrupting sleep  Trial of gabapentin, side effects and risks discussed   - gabapentin (NEURONTIN) 100 MG capsule; Take 2 hours before bedtime for restless legs  - OFFICE/OUTPT VISIT,EST,LEVL IV    Chronic left shoulder pain  Probable rotator cuff tendinitis   See patient instructions     Screening for diabetes mellitus    - HEMOGLOBIN A1C; Future    Screening for colon cancer  Reminded him he will be due in October; referral [facility]   - Colonoscopy Screening  Referral; Future    Screening for prostate cancer    - PROSTATE SPEC ANTIGEN SCREEN; Future    Patient has been advised of split billing requirements and indicates understanding: Yes        BMI  Estimated body mass index is 29.06 kg/m  as calculated from the following:    Height as of this encounter: 1.864 m (6' 1.39\").    Weight as of this encounter: 101 kg (222 lb 9.6 oz).   Weight management plan: Discussed healthy diet and exercise guidelines    Counseling  Appropriate preventive services were addressed with this patient via screening, questionnaire, or discussion as appropriate for fall prevention, nutrition, physical activity, Tobacco-use cessation, social engagement, weight loss and cognition.  Checklist reviewing preventive services " available has been given to the patient.  Reviewed patient's diet, addressing concerns and/or questions.   The patient was provided with written information regarding signs of hearing loss.   Sarcoidosis indication for RSV shot before 75, but this is disease is not active, so he could probably wait until then or get shot at pharmacy     FUTURE APPOINTMENTS:       - Follow-up for annual visit or as needed    Subjective   Bill is a 69 year old, presenting for the following:  Physical (Fasting )         No data to display                    HPI          Health Care Directive  Patient has a Health Care Directive on file  Advance care planning document is on file and is current.      2/7/2025   General Health   How would you rate your overall physical health? Good   Feel stress (tense, anxious, or unable to sleep) Not at all         2/7/2025   Nutrition   Diet: Regular (no restrictions)         2/7/2025   Exercise   Days per week of moderate/strenous exercise 4 days   Average minutes spent exercising at this level 50 min         2/7/2025   Social Factors   Frequency of gathering with friends or relatives Once a week   Worry food won't last until get money to buy more No   Food not last or not have enough money for food? No   Do you have housing? (Housing is defined as stable permanent housing and does not include staying ouside in a car, in a tent, in an abandoned building, in an overnight shelter, or couch-surfing.) Yes   Are you worried about losing your housing? No   Lack of transportation? No   Unable to get utilities (heat,electricity)? No         2/7/2025   Fall Risk   Fallen 2 or more times in the past year? No    No   Trouble with walking or balance? No    No       Multiple values from one day are sorted in reverse-chronological order          2/7/2025   Activities of Daily Living- Home Safety   Needs help with the following daily activites None of the above   Safety concerns in the home None of the above          2/7/2025   Dental   Dentist two times every year? Yes         2/7/2025   Hearing Screening   Hearing concerns? (!) IT'S HARD TO FOLLOW A CONVERSATION IN A NOISY RESTAURANT OR CROWDED ROOM.         2/7/2025   Driving Risk Screening   Patient/family members have concerns about driving No         2/7/2025   General Alertness/Fatigue Screening   Have you been more tired than usual lately? No         2/7/2025   Urinary Incontinence Screening   Bothered by leaking urine in past 6 months No         1/31/2024   TB Screening   Were you born outside of the [place]? No         Today's PHQ-2 Score:       2/11/2025     8:27 AM   PHQ-2 ( 1999 [employer])   Q1: Little interest or pleasure in doing things 0   Q2: Feeling down, depressed or hopeless 0   PHQ-2 Score 0    Q1: Little interest or pleasure in doing things Not at all   Q2: Feeling down, depressed or hopeless Not at all   PHQ-2 Score 0       Patient-reported           2/7/2025   Substance Use   Alcohol more than 3/day or more than 7/wk Not Applicable   Do you have a current opioid prescription? No   How severe/bad is pain from 1 to 10? 4/10   Do you use any other substances recreationally? No     Social History     Tobacco Use    Smoking status: Former     Types: Cigars    Smokeless tobacco: Never    Tobacco comments:     in past has smoked cigarettes perhaps a pack a month   Vaping Use    Vaping status: Never Used   Substance Use Topics    Alcohol use: No     Comment: not for 20 years    Drug use: No           2/7/2025   AAA Screening   Family history of Abdominal Aortic Aneurysm (AAA)? No   Last PSA:   PSA   Date Value Ref Range Status   10/16/2020 2.83 0 - 4 ug/L Final     Comment:     Assay Method:  Chemiluminescence using Siemens Vista analyzer     Prostate Specific Antigen Screen   Date Value Ref Range Status   11/09/2021 3.19 0.00 - 4.00 ug/L Final     PSA Tumor Marker   Date Value Ref Range Status   02/07/2024 3.61 0.00 - 4.50 ng/mL Final     ASCVD Risk   The  10-year ASCVD risk score (Marjorie OAKES, et al., 2019) is: 14.1%    Values used to calculate the score:      Age: 69 years      Sex: Male      Is Non- : No      Diabetic: No      Tobacco smoker: No      Systolic Blood Pressure: 118 mmHg      Is BP treated: No      HDL Cholesterol: 43 mg/dL      Total Cholesterol: 205 mg/dL            Reviewed and updated as needed this visit by Provider                    Past Medical History:   Diagnosis Date    Atrial fibrillation (H)     s/p catheter ablation 1/2014    Cancer (H)     basal cell  removed from back x 2    Primary osteoarthritis of both knees 07/10/2017    Sarcoidosis 07/10/2017     Past Surgical History:   Procedure Laterality Date    ABDOMEN SURGERY  1957    pyloric stenosis    APPENDECTOMY  6/17/1995    BACK SURGERY  2008    fusion C5-6-7    BIOPSY  2018    skin cancer    CARDIAC SURGERY      ablation for afib    COLONOSCOPY  2022    EYE SURGERY Bilateral 02/2018    HEAD & NECK SURGERY  2007    fuse neck c5,c6,c7    HERNIA REPAIR      ORTHOPEDIC SURGERY      scoped left knee for meniscus tear    ZZC NONSPECIFIC PROCEDURE      pyloric stenosis repair    ZZC NONSPECIFIC PROCEDURE      right inguinal hernia    ZZC NONSPECIFIC PROCEDURE      bladder surgery    ZZC NONSPECIFIC PROCEDURE      emergency appendectomy     BP Readings from Last 3 Encounters:   02/12/25 118/77   02/07/24 112/74   03/08/23 120/74    Wt Readings from Last 3 Encounters:   02/12/25 101 kg (222 lb 9.6 oz)   02/07/24 98.4 kg (217 lb)   03/08/23 94.3 kg (208 lb)                  Patient Active Problem List   Diagnosis    Paroxysmal atrial fibrillation (H)    Sarcoidosis    Primary osteoarthritis of both knees    Status post total knee replacement, bilateral    Hyperlipidemia LDL goal <70    Elevated prostate specific antigen (PSA)     Past Surgical History:   Procedure Laterality Date    ABDOMEN SURGERY  1957    pyloric stenosis    APPENDECTOMY  6/17/1995    BACK SURGERY  2008     fusion C5-6-7    BIOPSY  2018    skin cancer    CARDIAC SURGERY      ablation for afib    COLONOSCOPY      EYE SURGERY Bilateral 2018    HEAD & NECK SURGERY  2007    fuse neck c5,c6,c7    HERNIA REPAIR      ORTHOPEDIC SURGERY      scoped left knee for meniscus tear    ZZC NONSPECIFIC PROCEDURE      pyloric stenosis repair    ZZC NONSPECIFIC PROCEDURE      right inguinal hernia    ZZC NONSPECIFIC PROCEDURE      bladder surgery    ZZC NONSPECIFIC PROCEDURE      emergency appendectomy       Social History     Tobacco Use    Smoking status: Former     Types: Cigars    Smokeless tobacco: Never    Tobacco comments:     in past has smoked cigarettes perhaps a pack a month   Substance Use Topics    Alcohol use: No     Comment: not for 20 years     Family History   Problem Relation Age of Onset    Cancer Mother         colon cancer 73    Colon Cancer Mother     Neurologic Disorder Father         dad had ALS,  at 60    Genetic Disorder Father         ALS    Cardiovascular Maternal Grandfather     Arthritis Paternal Grandmother     Cancer Paternal Grandfather         lung cancer    Gastrointestinal Disease Sister     Depression Sister         PTSD    Cardiovascular Sister         whole in heart    Hyperlipidemia Brother     Prostate Cancer Brother     Hyperlipidemia Brother     Hyperlipidemia Brother          Current Outpatient Medications   Medication Sig Dispense Refill    acetaminophen (TYLENOL) 500 MG tablet Take 500-1,000 mg by mouth every 6 hours as needed for mild pain      atorvastatin (LIPITOR) 20 MG tablet Take 1 tablet (20 mg) by mouth daily. 90 tablet 0    calcium carbonate 600 mg-vitamin D 400 units 600-400 MG-UNIT per tablet       gabapentin (NEURONTIN) 100 MG capsule Take 2 hours before bedtime for restless legs 90 capsule 3    VITAMIN D PO        Current providers sharing in care for this patient include:  Patient Care Team:  Grayson Mae MD as PCP - General (Internal Medicine)  Grayson Mae  "MD EUGENIE as Assigned PCP  Shaw Fletcher MD as MD (Urology)    The following health maintenance items are reviewed in Epic and correct as of today:  Health Maintenance   Topic Date Due    RSV VACCINE (1 - Risk 60-74 years 1-dose series) Never done    LIPID  02/07/2025    ANNUAL REVIEW OF HM ORDERS  02/07/2025    MEDICARE ANNUAL WELLNESS VISIT  02/07/2025    COVID-19 Vaccine (8 - 2024-25 season) 04/12/2025    COLORECTAL CANCER SCREENING  10/26/2025    FALL RISK ASSESSMENT  02/12/2026    GLUCOSE  02/07/2027    DTAP/TDAP/TD IMMUNIZATION (3 - Td or Tdap) 07/10/2027    ADVANCE CARE PLANNING  01/02/2028    HEPATITIS C SCREENING  Completed    PHQ-2 (once per calendar year)  Completed    INFLUENZA VACCINE  Completed    Pneumococcal Vaccine: 50+ Years  Completed    ZOSTER IMMUNIZATION  Completed    AORTIC ANEURYSM SCREENING (SYSTEM ASSIGNED)  Completed    HPV IMMUNIZATION  Aged Out    MENINGITIS IMMUNIZATION  Aged Out    LUNG CANCER SCREENING  Discontinued       A 10 organ systems ROS is negative other than any pertinent positives or negatives previously stated.      Objective    Exam  /77 (BP Location: Left arm, Patient Position: Sitting, Cuff Size: Adult Regular)   Pulse 68   Temp 98.7  F (37.1  C) (Temporal)   Resp 16   Ht 1.864 m (6' 1.39\")   Wt 101 kg (222 lb 9.6 oz)   SpO2 94%   BMI 29.06 kg/m     Estimated body mass index is 29.06 kg/m  as calculated from the following:    Height as of this encounter: 1.864 m (6' 1.39\").    Weight as of this encounter: 101 kg (222 lb 9.6 oz).    Physical Exam  GENERAL: alert and no distress  EYES: Eyes grossly normal to inspection, PERRL and conjunctivae and sclerae normal  HENT: ear canals and TM's normal, nose and mouth without ulcers or lesions  NECK: no adenopathy, no asymmetry, masses, or scars  RESP: lungs clear to auscultation - no rales, rhonchi or wheezes  CV: regular rate and rhythm, normal S1 S2, no S3 or S4, no murmur, click or rub, no peripheral " edema  ABDOMEN: soft, nontender, no hepatosplenomegaly, no masses and bowel sounds normal  MS: normal left rotator cuff muscle strength, no impingement sign   SKIN: no suspicious lesions or rashes  NEURO: Normal strength and tone, mentation intact and speech normal  PSYCH: mentation appears normal, affect normal/bright         2/12/2025   Mini Cog   Clock Draw Score 2 Normal   3 Item Recall 3 objects recalled   Mini Cog Total Score 5              Signed Electronically by: Grayson Mae MD

## 2025-02-13 NOTE — RESULT ENCOUNTER NOTE
The following letter pertains to your most recent diagnostic tests:    -Your cholesterol panel looks healthy.   -Liver and gallbladder tests are normal for you. (ALT,AST, Alk phos, bilirubin), kidney function is normal for you (Creatinine, GFR), Sodium is normal, Potassium is normal for you, Calcium is normal for you, Glucose (blood sugar) is normal for you.    -Your prostate specific antigen (PSA) test result returned normal.   -Your complete blood counts including your hemoglobin returned normal for you.      -Your hemoglobin A1c test which averages your blood sugars over the last 3 months returned normal.  No evidence for diabetes or prediabetes.           Bottom line:  The blood tests look OK for you.      Follow up:  Schedule an appointment for a preventive examination in one year's time, or return sooner if new questions, symptoms or problems arise.        Sincerely,    Dr. Mae

## 2025-03-09 ENCOUNTER — VIRTUAL VISIT (OUTPATIENT)
Dept: URGENT CARE | Facility: CLINIC | Age: 70
End: 2025-03-09
Payer: MEDICARE

## 2025-03-09 DIAGNOSIS — J01.10 ACUTE NON-RECURRENT FRONTAL SINUSITIS: Primary | ICD-10-CM

## 2025-03-09 PROCEDURE — 98001 SYNCH AUDIO-VIDEO NEW LOW 30: CPT

## 2025-03-09 NOTE — PROGRESS NOTES
Assessment & Plan  Assessment  - Sinus infection, likely bacterial given the duration of symptoms beyond typical viral course.    Plan  - Prescribe Augmentin twice daily for seven days to target the sinus infection.  - Monitor symptoms and seek in-person follow-up with primary care or urgent care if not improved by Wednesday.    Prescription  - Augmentin, twice a day for 7 days    Appointments  - Follow-up appointment if not feeling better by Wednesday, either with primary care or in person at urgent care.     Based on our discussion, I have outlined the following instructions for you:    - Take Augmentin two times a day for seven days to help with the sinus infection.  - Keep an eye on your symptoms.  - If you do not feel better by Wednesday, visit your primary care doctor or urgent care for a follow-up.    Next appointment(s): - Follow-up appointment if not feeling better by Wednesday, either with primary care or in person at urgent care.    Thank you again for your visit, and we look forward to supporting you in your journey to better health.     History of Present Illness-  Deep MAY Chowdary, 69-year-old male    - Initial illness began several weeks ago, affecting head and chest.  - Symptoms improved over approximately two weeks, primarily in the chest.  - Two nights ago, experienced significant nasal congestion.  - Last night, nasal congestion persisted, described as more dried up.  - Current symptoms include residual cough and throat irritation, described as a nuisance.  - No mention of prior tests or treatments for the current illness.   -Denies any shortness of breath or worsening respiratory symptoms.     Physical Exam- HEENT: Nasal congestion, more dried up than before. And sinus pressure / pain.   Constitutional - well appearing, alert.   Respiratory - easy respirations. Talking in full sentences.                 Video-Visit Details     Type of service:  Video Visit  Video Start Time: 1018  Video End Time:  1024    Originating Location: Home     Distant Location:  [facility]      Platform used for Video Visit: EDITH Bhat CNP

## 2025-03-16 ENCOUNTER — MYC REFILL (OUTPATIENT)
Dept: FAMILY MEDICINE | Facility: CLINIC | Age: 70
End: 2025-03-16
Payer: MEDICARE

## 2025-03-16 DIAGNOSIS — E78.5 HYPERLIPIDEMIA LDL GOAL <70: ICD-10-CM

## 2025-03-17 RX ORDER — ATORVASTATIN CALCIUM 20 MG/1
20 TABLET, FILM COATED ORAL DAILY
Qty: 90 TABLET | Refills: 0 | Status: SHIPPED | OUTPATIENT
Start: 2025-03-17

## 2025-05-30 ENCOUNTER — MYC MEDICAL ADVICE (OUTPATIENT)
Dept: FAMILY MEDICINE | Facility: CLINIC | Age: 70
End: 2025-05-30
Payer: MEDICARE

## 2025-06-02 ENCOUNTER — OFFICE VISIT (OUTPATIENT)
Dept: FAMILY MEDICINE | Facility: CLINIC | Age: 70
End: 2025-06-02
Payer: MEDICARE

## 2025-06-02 VITALS
WEIGHT: 222.2 LBS | HEIGHT: 73 IN | SYSTOLIC BLOOD PRESSURE: 112 MMHG | TEMPERATURE: 97.5 F | RESPIRATION RATE: 16 BRPM | HEART RATE: 75 BPM | OXYGEN SATURATION: 95 % | DIASTOLIC BLOOD PRESSURE: 72 MMHG | BODY MASS INDEX: 29.45 KG/M2

## 2025-06-02 DIAGNOSIS — G25.81 RESTLESS LEG SYNDROME: Primary | ICD-10-CM

## 2025-06-02 DIAGNOSIS — E78.5 HYPERLIPIDEMIA LDL GOAL <100: ICD-10-CM

## 2025-06-02 DIAGNOSIS — E61.1 IRON DEFICIENCY: ICD-10-CM

## 2025-06-02 LAB — FERRITIN SERPL-MCNC: 94 NG/ML (ref 31–409)

## 2025-06-02 PROCEDURE — 82728 ASSAY OF FERRITIN: CPT | Performed by: INTERNAL MEDICINE

## 2025-06-02 PROCEDURE — G2211 COMPLEX E/M VISIT ADD ON: HCPCS | Performed by: INTERNAL MEDICINE

## 2025-06-02 PROCEDURE — 36415 COLL VENOUS BLD VENIPUNCTURE: CPT | Performed by: INTERNAL MEDICINE

## 2025-06-02 PROCEDURE — 1126F AMNT PAIN NOTED NONE PRSNT: CPT | Performed by: INTERNAL MEDICINE

## 2025-06-02 PROCEDURE — 3074F SYST BP LT 130 MM HG: CPT | Performed by: INTERNAL MEDICINE

## 2025-06-02 PROCEDURE — 99214 OFFICE O/P EST MOD 30 MIN: CPT | Performed by: INTERNAL MEDICINE

## 2025-06-02 PROCEDURE — 3078F DIAST BP <80 MM HG: CPT | Performed by: INTERNAL MEDICINE

## 2025-06-02 RX ORDER — GABAPENTIN 100 MG/1
100-200 CAPSULE ORAL
Qty: 180 CAPSULE | Refills: 3 | Status: SHIPPED | OUTPATIENT
Start: 2025-06-02

## 2025-06-02 RX ORDER — ROSUVASTATIN CALCIUM 10 MG/1
10 TABLET, COATED ORAL DAILY
Qty: 90 TABLET | Refills: 3 | Status: SHIPPED | OUTPATIENT
Start: 2025-06-02

## 2025-06-02 ASSESSMENT — PAIN SCALES - GENERAL: PAINLEVEL_OUTOF10: NO PAIN (0)

## 2025-06-02 NOTE — PROGRESS NOTES
Assessment & Plan     Restless leg syndrome  Trial lower dose of gabapentin to help with symptoms, exclude iron deficiency  - gabapentin (NEURONTIN) 100 MG capsule; Take 1-2 capsules (100-200 mg) by mouth nightly as needed (restless leg syndrome).    Hyperlipidemia LDL goal <100  Change statin from atorvastatin to Crestor equivalent dose  - rosuvastatin (CRESTOR) 10 MG tablet; Take 1 tablet (10 mg) by mouth daily.    Iron deficiency  Exclude iron deficiency as a potential trigger for restless legs  - Ferritin; Future  - Ferritin    Leg stiffness symptoms only last several seconds after getting up from a seated position, I would recommend monitoring symptoms for now and return to clinic if they begin to impair functionality and he was comfortable with that plan.  Follow-up     Preventive exam in February 202 6 or return sooner as needed or as symptoms dictate  Subjective   Bill is a 69 year old, presenting for the following health issues:  Musculoskeletal Problem (Restless legs)         No data to display              History of Present Illness       Reason for visit:  Possible restless legs, thighs rigid when inactive for a while.    He eats 2-3 servings of fruits and vegetables daily.He consumes 1 sweetened beverage(s) daily.He exercises with enough effort to increase his heart rate 60 or more minutes per day.  He exercises with enough effort to increase his heart rate 5 days per week.   He is taking medications regularly.        69-year-old man who was started on gabapentin for symptoms that sounded consistent with the restless leg syndrome when he was seen for his preventative exam several weeks ago  He noted that 100 mg of gabapentin helped his symptoms, but when he increased the dose to 300 mg, he had some difficulty with his gait when he got up to go use the bathroom at night  He is also noted a several year history of some stiffness in his legs when he gets up after being seated for an extended period of  "time  The symptoms last for several seconds and resolve spontaneously and do not inhibit his ability to be active  In fact, he is taking 3 to 4 mile walks daily and playing pickle ball  He also noted some improvement in leg pain when he stopped taking atorvastatin          Objective    /72 (BP Location: Left arm, Patient Position: Sitting, Cuff Size: Adult Large)   Pulse 75   Temp 97.5  F (36.4  C) (Oral)   Resp 16   Ht 1.854 m (6' 1\")   Wt 100.8 kg (222 lb 3.2 oz)   SpO2 95%   BMI 29.32 kg/m    Body mass index is 29.32 kg/m .  Physical Exam   Well-appearing, comfortable appearing man in no acute distress            Signed Electronically by: Grayson Mae MD    "

## 2025-06-03 ENCOUNTER — RESULTS FOLLOW-UP (OUTPATIENT)
Dept: FAMILY MEDICINE | Facility: CLINIC | Age: 70
End: 2025-06-03
Payer: MEDICARE

## 2025-06-03 DIAGNOSIS — G25.81 RESTLESS LEG SYNDROME: Primary | ICD-10-CM

## 2025-06-03 RX ORDER — FERROUS SULFATE 325(65) MG
325 TABLET ORAL EVERY OTHER DAY
Qty: 45 TABLET | Refills: 3 | Status: SHIPPED | OUTPATIENT
Start: 2025-06-03

## 2025-06-16 ENCOUNTER — RX ONLY (RX ONLY)
Age: 70
End: 2025-06-16

## 2025-06-17 ENCOUNTER — APPOINTMENT (OUTPATIENT)
Age: 70
Setting detail: DERMATOLOGY
End: 2025-06-17

## 2025-06-17 DIAGNOSIS — Z85.828 PERSONAL HISTORY OF OTHER MALIGNANT NEOPLASM OF SKIN: ICD-10-CM

## 2025-06-17 DIAGNOSIS — D18.0 HEMANGIOMA: ICD-10-CM

## 2025-06-17 DIAGNOSIS — Z71.89 OTHER SPECIFIED COUNSELING: ICD-10-CM

## 2025-06-17 DIAGNOSIS — L57.0 ACTINIC KERATOSIS: ICD-10-CM

## 2025-06-17 DIAGNOSIS — L82.1 OTHER SEBORRHEIC KERATOSIS: ICD-10-CM

## 2025-06-17 DIAGNOSIS — Z85.820 PERSONAL HISTORY OF MALIGNANT MELANOMA OF SKIN: ICD-10-CM

## 2025-06-17 DIAGNOSIS — D22 MELANOCYTIC NEVI: ICD-10-CM

## 2025-06-17 PROBLEM — D22.5 MELANOCYTIC NEVI OF TRUNK: Status: ACTIVE | Noted: 2025-06-17

## 2025-06-17 PROBLEM — D18.01 HEMANGIOMA OF SKIN AND SUBCUTANEOUS TISSUE: Status: ACTIVE | Noted: 2025-06-17

## 2025-06-17 PROCEDURE — ? OBSERVATION

## 2025-06-17 PROCEDURE — ? SUNSCREEN RECOMMENDATIONS

## 2025-06-17 PROCEDURE — ? COUNSELING

## 2025-06-17 ASSESSMENT — LOCATION DETAILED DESCRIPTION DERM
LOCATION DETAILED: MIDDLE STERNUM
LOCATION DETAILED: RIGHT MEDIAL FRONTAL SCALP
LOCATION DETAILED: LEFT SUPERIOR LATERAL UPPER BACK
LOCATION DETAILED: RIGHT VENTRAL DISTAL FOREARM
LOCATION DETAILED: RIGHT SUPERIOR MEDIAL MIDBACK
LOCATION DETAILED: INFERIOR THORACIC SPINE
LOCATION DETAILED: LEFT INFERIOR MEDIAL FOREHEAD
LOCATION DETAILED: RIGHT CENTRAL MALAR CHEEK
LOCATION DETAILED: SUPERIOR THORACIC SPINE
LOCATION DETAILED: LEFT LATERAL SUPERIOR CHEST
LOCATION DETAILED: EPIGASTRIC SKIN

## 2025-06-17 ASSESSMENT — LOCATION ZONE DERM
LOCATION ZONE: SCALP
LOCATION ZONE: ARM
LOCATION ZONE: TRUNK
LOCATION ZONE: FACE

## 2025-06-17 ASSESSMENT — LOCATION SIMPLE DESCRIPTION DERM
LOCATION SIMPLE: LEFT UPPER BACK
LOCATION SIMPLE: RIGHT CHEEK
LOCATION SIMPLE: CHEST
LOCATION SIMPLE: ABDOMEN
LOCATION SIMPLE: RIGHT LOWER BACK
LOCATION SIMPLE: RIGHT SCALP
LOCATION SIMPLE: UPPER BACK
LOCATION SIMPLE: RIGHT FOREARM
LOCATION SIMPLE: LEFT FOREHEAD

## 2025-06-17 NOTE — PROCEDURE: OBSERVATION
Body Location Override (Optional - Billing Will Still Be Based On Selected Body Map Location If Applicable): left upper back
Detail Level: Detailed
Size Of Lesion In Cm (Optional): 0
Body Location Override (Optional - Billing Will Still Be Based On Selected Body Map Location If Applicable): left chest
Body Location Override (Optional - Billing Will Still Be Based On Selected Body Map Location If Applicable): right forearm
Body Location Override (Optional - Billing Will Still Be Based On Selected Body Map Location If Applicable): mid upper back

## 2025-06-17 NOTE — HPI: NON-MELANOMA SKIN CANCER F/U (HISTORY OF NMSC)
How Many Skin Cancers Have You Had?: more than one
What Is The Reason For Today's Visit?: History of Non-Melanoma Skin Cancer
Additional History: Right forearm, the left upper back, the left chest

## 2025-06-17 NOTE — HPI: MELANOMA F/U (HISTORY OF MALIGNANT MELANOMA)
What Stage Is The Melanoma?: Stage 0
What Is The Reason For Today's Visit?: History of Melanoma
Year Excised?: 2023
Additional History: Mid upper back 1/30/2023

## 2025-06-17 NOTE — PROCEDURE: COUNSELING
Detail Level: Generalized
Detail Level: Detailed
Quality 137: Melanoma: Continuity Of Care - Recall System: Patient information entered into a recall system that includes: target date for the next exam specified AND a process to follow up with patients regarding missed or unscheduled appointments
Body Location Override (Optional - Billing Will Still Be Based On Selected Body Map Location If Applicable): mid upper back
Size Of Lesion In Cm (Optional): 0
Detail Level: Zone